# Patient Record
Sex: FEMALE | Race: WHITE | NOT HISPANIC OR LATINO | Employment: OTHER | URBAN - METROPOLITAN AREA
[De-identification: names, ages, dates, MRNs, and addresses within clinical notes are randomized per-mention and may not be internally consistent; named-entity substitution may affect disease eponyms.]

---

## 2017-02-23 ENCOUNTER — GENERIC CONVERSION - ENCOUNTER (OUTPATIENT)
Dept: OTHER | Facility: OTHER | Age: 67
End: 2017-02-23

## 2017-02-28 ENCOUNTER — GENERIC CONVERSION - ENCOUNTER (OUTPATIENT)
Dept: OTHER | Facility: OTHER | Age: 67
End: 2017-02-28

## 2017-03-20 ENCOUNTER — ALLSCRIPTS OFFICE VISIT (OUTPATIENT)
Dept: OTHER | Facility: OTHER | Age: 67
End: 2017-03-20

## 2018-01-13 VITALS
TEMPERATURE: 98.2 F | DIASTOLIC BLOOD PRESSURE: 80 MMHG | SYSTOLIC BLOOD PRESSURE: 124 MMHG | RESPIRATION RATE: 18 BRPM | HEART RATE: 78 BPM

## 2018-01-13 NOTE — RESULT NOTES
Verified Results  (Q) CBC (H/H, RBC, INDICES, WBC, PLT) 07Apr2016 08:52AM Joyce Esposito   REPORT COMMENT:  FASTING:YES     Test Name Result Flag Reference   WHITE BLOOD CELL COUNT 6 1 Thousand/uL  3 8-10 8   RED BLOOD CELL COUNT 4 51 Million/uL  3 80-5 10   HEMOGLOBIN 13 8 g/dL  11 7-15 5   HEMATOCRIT 41 8 %  35 0-45 0   MCV 92 9 fL  80 0-100 0   MCH 30 6 pg  27 0-33 0   MCHC 33 0 g/dL  32 0-36 0   RDW 13 1 %  11 0-15 0   PLATELET COUNT 982 Thousand/uL  140-400   MPV 7 6 fL  7 5-11 5     (Q) COMPREHENSIVE METABOLIC PNL W/ADJUSTED CALCIUM 07Apr2016 08:52AM Rizwana Esposito     Test Name Result Flag Reference   GLUCOSE 97 mg/dL  65-99   Fasting reference interval   UREA NITROGEN (BUN) 14 mg/dL  7-25   CREATININE 0 74 mg/dL  0 50-0 99   For patients >52years of age, the reference limit  for Creatinine is approximately 13% higher for people  identified as -American  eGFR NON-AFR  AMERICAN 85 mL/min/1 73m2  > OR = 60   eGFR AFRICAN AMERICAN 99 mL/min/1 73m2  > OR = 60   BUN/CREATININE RATIO   7-31   NOT APPLICABLE (calc)   SODIUM 141 mmol/L  135-146   POTASSIUM 3 8 mmol/L  3 5-5 3   CHLORIDE 103 mmol/L     CARBON DIOXIDE 29 mmol/L  19-30   CALCIUM 9 7 mg/dL  8 6-10 4   CALCIUM (ADJUSTED FOR$ALBUMIN) 9 9 mg/dL (calc)  8 6-10 2   PROTEIN, TOTAL 7 3 g/dL  6 1-8 1   ALBUMIN 4 1 g/dL  3 6-5 1   GLOBULIN 3 2 g/dL (calc)  1 9-3 7   ALBUMIN/GLOBULIN RATIO 1 3 (calc)  1 0-2 5   BILIRUBIN, TOTAL 0 6 mg/dL  0 2-1 2   ALKALINE PHOSPHATASE 64 U/L     AST 21 U/L  10-35   ALT 26 U/L  6-29     (Q) LIPID PANEL WITH REFLEX TO DIRECT LDL 07Apr2016 08:52AM Rizwana Esposito     Test Name Result Flag Reference   CHOLESTEROL, TOTAL 234 mg/dL H 125-200   HDL CHOLESTEROL 38 mg/dL L > OR = 46   TRIGLICERIDES 724 mg/dL H <150   LDL-CHOLESTEROL 155 mg/dL (calc) H <130   Desirable range <100 mg/dL for patients with CHD or  diabetes and <70 mg/dL for diabetic patients with  known heart disease     CHOL/HDLC RATIO 6 2 (calc) H < OR = 5 0   NON HDL CHOLESTEROL 196 mg/dL (calc) H    Target for non-HDL cholesterol is 30 mg/dL higher than   LDL cholesterol target

## 2018-01-18 NOTE — RESULT NOTES
Verified Results  (1) CBC/PLT/DIFF [id number] 09:17AM Eusebio Esposito   REPORT COMMENT:  FASTING:YES     Test Name Result Flag Reference   WHITE BLOOD CELL COUNT 5 9 Thousand/uL  3 8-10 8   RED BLOOD CELL COUNT 4 50 Million/uL  3 80-5 10   HEMOGLOBIN 13 9 g/dL  11 7-15 5   HEMATOCRIT 41 2 %  35 0-45 0   MCV 91 5 fL  80 0-100 0   MCH 31 0 pg  27 0-33 0   MCHC 33 9 g/dL  32 0-36 0   RDW 13 0 %  11 0-15 0   PLATELET COUNT 483 Thousand/uL H 140-400   MPV 7 7 fL  7 5-12 5   ABSOLUTE NEUTROPHILS 2950 cells/uL  6618-9999   ABSOLUTE LYMPHOCYTES 2543 cells/uL  850-3900   ABSOLUTE MONOCYTES 266 cells/uL  200-950   ABSOLUTE EOSINOPHILS 124 cells/uL     ABSOLUTE BASOPHILS 18 cells/uL  0-200   NEUTROPHILS 50 0 %     LYMPHOCYTES 43 1 %     MONOCYTES 4 5 %     EOSINOPHILS 2 1 %     BASOPHILS 0 3 %       (1) COMPREHENSIVE METABOLIC PANEL [id number] 20:57HC Eusebio Esposito     Test Name Result Flag Reference   GLUCOSE 103 mg/dL H 65-99   Fasting reference interval   UREA NITROGEN (BUN) 14 mg/dL  7-25   CREATININE 0 78 mg/dL  0 50-0 99   For patients >52years of age, the reference limit  for Creatinine is approximately 13% higher for people  identified as -American  eGFR NON-AFR   AMERICAN 79 mL/min/1 73m2  > OR = 60   eGFR AFRICAN AMERICAN 92 mL/min/1 73m2  > OR = 60   BUN/CREATININE RATIO   7-92   NOT APPLICABLE (calc)   SODIUM 139 mmol/L  135-146   POTASSIUM 4 2 mmol/L  3 5-5 3   CHLORIDE 101 mmol/L     CARBON DIOXIDE 29 mmol/L  20-31   CALCIUM 9 6 mg/dL  8 6-10 4   PROTEIN, TOTAL 7 5 g/dL  6 1-8 1   ALBUMIN 4 2 g/dL  3 6-5 1   GLOBULIN 3 3 g/dL (calc)  1 9-3 7   ALBUMIN/GLOBULIN RATIO 1 3 (calc)  1 0-2 5   BILIRUBIN, TOTAL 0 6 mg/dL  0 2-1 2   ALKALINE PHOSPHATASE 66 U/L     AST 24 U/L  10-35   ALT 33 U/L H 6-29     (1) LIPID PANEL, FASTING [id number] 09:17AM Eusebio Esposito     Test Name Result Flag Reference   CHOLESTEROL, TOTAL 242 mg/dL H 125-200   HDL CHOLESTEROL 36 mg/dL L > OR = 46   TRIGLICERIDES 260 mg/dL H <150   LDL-CHOLESTEROL 154 mg/dL (calc) H <130   Desirable range <100 mg/dL for patients with CHD or  diabetes and <70 mg/dL for diabetic patients with  known heart disease  CHOL/HDLC RATIO 6 7 (calc) H < OR = 5 0   NON HDL CHOLESTEROL 206 mg/dL (calc) H    Target for non-HDL cholesterol is 30 mg/dL higher than   LDL cholesterol target

## 2018-02-05 ENCOUNTER — TRANSCRIBE ORDERS (OUTPATIENT)
Dept: ADMINISTRATIVE | Facility: HOSPITAL | Age: 68
End: 2018-02-05

## 2018-02-05 DIAGNOSIS — Z00.00 ROUTINE ADULT HEALTH MAINTENANCE: Primary | ICD-10-CM

## 2018-02-25 RX ORDER — CHLORAL HYDRATE 500 MG
2 CAPSULE ORAL 2 TIMES DAILY
COMMUNITY
Start: 2012-04-16 | End: 2018-02-28 | Stop reason: SDUPTHER

## 2018-02-25 RX ORDER — ACYCLOVIR 50 MG/G
CREAM TOPICAL
COMMUNITY
Start: 2017-03-20 | End: 2019-03-13 | Stop reason: ALTCHOICE

## 2018-02-25 RX ORDER — HYDROCHLOROTHIAZIDE 12.5 MG/1
12.5 CAPSULE, GELATIN COATED ORAL DAILY
Refills: 3 | COMMUNITY
Start: 2017-12-09 | End: 2018-02-28 | Stop reason: SDUPTHER

## 2018-02-28 ENCOUNTER — OFFICE VISIT (OUTPATIENT)
Dept: FAMILY MEDICINE CLINIC | Facility: CLINIC | Age: 68
End: 2018-02-28
Payer: MEDICARE

## 2018-02-28 VITALS
DIASTOLIC BLOOD PRESSURE: 78 MMHG | HEIGHT: 63 IN | SYSTOLIC BLOOD PRESSURE: 146 MMHG | RESPIRATION RATE: 16 BRPM | BODY MASS INDEX: 31.01 KG/M2 | WEIGHT: 175 LBS | TEMPERATURE: 95.9 F | HEART RATE: 84 BPM

## 2018-02-28 DIAGNOSIS — Z23 NEED FOR PROPHYLACTIC VACCINATION AGAINST STREPTOCOCCUS PNEUMONIAE (PNEUMOCOCCUS): ICD-10-CM

## 2018-02-28 DIAGNOSIS — Z00.00 MEDICARE ANNUAL WELLNESS VISIT, INITIAL: Primary | ICD-10-CM

## 2018-02-28 DIAGNOSIS — I10 BENIGN ESSENTIAL HYPERTENSION: ICD-10-CM

## 2018-02-28 DIAGNOSIS — E78.2 MIXED HYPERLIPIDEMIA: ICD-10-CM

## 2018-02-28 DIAGNOSIS — L30.9 DERMATITIS: ICD-10-CM

## 2018-02-28 PROCEDURE — 90732 PPSV23 VACC 2 YRS+ SUBQ/IM: CPT

## 2018-02-28 PROCEDURE — G0009 ADMIN PNEUMOCOCCAL VACCINE: HCPCS

## 2018-02-28 PROCEDURE — G0438 PPPS, INITIAL VISIT: HCPCS | Performed by: INTERNAL MEDICINE

## 2018-02-28 PROCEDURE — 99214 OFFICE O/P EST MOD 30 MIN: CPT | Performed by: INTERNAL MEDICINE

## 2018-02-28 RX ORDER — HYDROCHLOROTHIAZIDE 12.5 MG/1
12.5 CAPSULE, GELATIN COATED ORAL DAILY
Qty: 90 CAPSULE | Refills: 3 | Status: SHIPPED | OUTPATIENT
Start: 2018-02-28 | End: 2018-09-12 | Stop reason: SDUPTHER

## 2018-02-28 RX ORDER — CHLORAL HYDRATE 500 MG
2 CAPSULE ORAL 2 TIMES DAILY
Qty: 360 CAPSULE | Refills: 3 | Status: SHIPPED | OUTPATIENT
Start: 2018-02-28 | End: 2018-09-12 | Stop reason: SDUPTHER

## 2018-02-28 NOTE — PROGRESS NOTES
Subjective:      Patient ID: Carmen Dash is a 79 y o  female  Chief Complaint   Patient presents with    Follow-up     review new labs       Here for follow up of hypertension and hyperlipidemia  She has been travelling a lot and gained some weight  Just lost 8 of this and is continuing to try  Would like to lose another 15 pounds  Started about a month ago with red, tingling, itching rash under L eye  Went to the dermatologist who put her on a steroid cream   This helped but now back  Denies any new soap, detergent, skin cream or other exposures  Tried aquaphor and it helped for a time but now back again  Very itchy and scaly  Sees dermatologist tonight at 6:30  The following portions of the patient's history were reviewed and updated as appropriate: allergies, current medications, past family history, past medical history, past social history, past surgical history and problem list     Review of Systems   Constitutional: Negative  Respiratory: Negative  Cardiovascular: Negative  Skin:        As per HPI  Current Outpatient Prescriptions   Medication Sig Dispense Refill    acyclovir (ZOVIRAX) 5 % cream Apply topically      aspirin 81 MG tablet Take 1 tablet by mouth daily      hydrochlorothiazide (MICROZIDE) 12 5 mg capsule Take 1 capsule (12 5 mg total) by mouth daily 90 capsule 3    Omega-3 1000 MG CAPS Take 2 capsules (2,000 mg total) by mouth 2 (two) times a day 360 capsule 3     No current facility-administered medications for this visit  Objective:    /78   Pulse 84   Temp (!) 95 9 °F (35 5 °C)   Resp 16   Ht 5' 3" (1 6 m)   Wt 79 4 kg (175 lb)   LMP  (LMP Unknown)   BMI 31 00 kg/m²        Physical Exam   Constitutional: She appears well-developed and well-nourished  HENT:   Head: Normocephalic and atraumatic  Eyes: Conjunctivae are normal    Neck: Neck supple  No JVD present  No thyromegaly present     Cardiovascular: Normal rate, regular rhythm, normal heart sounds and intact distal pulses  Pulmonary/Chest: Effort normal and breath sounds normal  She has no wheezes  She has no rales  Abdominal: Soft  Bowel sounds are normal    Musculoskeletal: She exhibits no edema  Skin:   L cheek and lateral angle of mouth with erythema and white scaling  Assessment/Plan:    Benign essential hypertension  Hypertension stable, continue HCTZ  Hyperlipidemia  She will continue omega 3's, low fat diet, exercise  Follow up after labs in 6 months  Dermatitis  Seeing dermatologist tonight  Diagnoses and all orders for this visit:    Medicare annual wellness visit, initial    Benign essential hypertension  -     CBC and differential; Future  -     Comprehensive metabolic panel; Future  -     Lipid panel; Future  -     hydrochlorothiazide (MICROZIDE) 12 5 mg capsule; Take 1 capsule (12 5 mg total) by mouth daily    Mixed hyperlipidemia  -     CBC and differential; Future  -     Comprehensive metabolic panel; Future  -     Lipid panel; Future  -     Omega-3 1000 MG CAPS; Take 2 capsules (2,000 mg total) by mouth 2 (two) times a day    Need for prophylactic vaccination against Streptococcus pneumoniae (pneumococcus)  -     PNEUMOCOCCAL POLYSACCHARIDE VACCINE 23-VALENT =>3YO SQ IM    Dermatitis    Other orders  -     aspirin 81 MG tablet; Take 1 tablet by mouth daily  -     Discontinue: hydrochlorothiazide (MICROZIDE) 12 5 mg capsule; Take 12 5 mg by mouth daily  -     Discontinue: Omega-3 1000 MG CAPS; Take 2 capsules by mouth 2 (two) times a day  -     acyclovir (ZOVIRAX) 5 % cream; Apply topically          No Follow-up on file         Saroj Julien MD

## 2018-02-28 NOTE — PROGRESS NOTES
Provider Screening     Preventative Screening/Counseling:   Cardiovascular Screening/Counseling:   (Labs Q5 years, EKG optional one-time)   General:  Risks and Benefits Discussed, Screening Current Counseling:  Healthy Diet, Healthy Weight, Improve Cholesterol, Improve Blood Pressure, Improve Exercise Tolerance          Diabetes Screening/Counseling:   (2 tests/year if Pre-Diabetes or 1 test/year if no Diabetes)   General:  Risks and Benefits Discussed, Screening Current           Colorectal Cancer Screening/Counseling:   (FOBT Q1 yr; Flex Sig Q4 yrs or Q10 yrs after Screening Colonoscopy; Screening Colonoscpy Q2 yrs High Risk or Q10 yrs Low Risk; Barium Enema Q2 yrs High Risk or Q4 yrs Low Risk)   General:  Patient Declines           Prostate Cancer Screening/Counseling:   (Annual)          Breast Cancer Screening/Counseling:   (Baseline Age 28 - 43; Annual Age 36+)   General:  Screening Current          Cervical Cancer Screening/Counseling:   (Annual for High Risk or Childbearing Age with Abnormal Pap in Last 3 yrs; Every 2 all others)   General:  Screening Not Indicated           Osteoporosis Screening/Counseling:   (Every 2 Yrs if at risk or more if medically necessary)   General:  Patient Declines           AAA Screening/Counseling:   (Once per Lifetime with risk factors)    General:  Screening Not Indicated           Glaucoma Screening/Counseling:   (Annual)   General:  Screening Current          HIV Screening/Counseling:   (Voluntary;  Once annually for high risk OR 3 times for Pregnancy at diagnosis of IUP; 3rd trimester; and at Labor   General:  Screening Not Indicated           Hepatitis C Screening:             Immunizations:   Influenza (annual):  Patient Declines   Pneumococcal (Once in a Lifetime):  Pneumococcal Due Today   Hepatitis B Series (low risk patients):  Series Not Indicated   Zostavax (Medicare D Coverage, Pt >66 yo):  Zostavax Vaccine UTD   TD (Non-Medicare Wellness  Visit required): Patient Declines       Other Preventative Couseling (Non-Medicare Wellness Visit Required):       Referrals (Non-Medicare Wellness Visit Required):       Medical Equipment/Suppliers:             HPI:  Emelina Kang is a 79 y o  female here for her Initial Wellness Visit  Patient Active Problem List   Diagnosis    Benign essential hypertension    Hyperlipidemia    Dermatitis    Medicare annual wellness visit, initial    Need for prophylactic vaccination against Streptococcus pneumoniae (pneumococcus)     No past medical history on file  No past surgical history on file  Family History   Problem Relation Age of Onset    Other Mother      aortic valve disorder    Stroke Mother      CVA    Hyperlipidemia Mother      History   Smoking Status    Never Smoker   Smokeless Tobacco    Never Used     History   Alcohol use Not on file      History   Drug use: Unknown     /78   Pulse 84   Temp (!) 95 9 °F (35 5 °C)   Resp 16   Ht 5' 3" (1 6 m)   Wt 79 4 kg (175 lb)   LMP  (LMP Unknown)   BMI 31 00 kg/m²       Current Outpatient Prescriptions   Medication Sig Dispense Refill    acyclovir (ZOVIRAX) 5 % cream Apply topically      aspirin 81 MG tablet Take 1 tablet by mouth daily      hydrochlorothiazide (MICROZIDE) 12 5 mg capsule Take 1 capsule (12 5 mg total) by mouth daily 90 capsule 3    Omega-3 1000 MG CAPS Take 2 capsules (2,000 mg total) by mouth 2 (two) times a day 360 capsule 3     No current facility-administered medications for this visit        Allergies   Allergen Reactions    Penicillins      Reaction Date: [id number];      Immunization History   Administered Date(s) Administered    Pneumococcal Conjugate 13-Valent 02/14/2017    Zoster 02/01/2016       Patient Care Team:  Roseanne Sheth MD as PCP - General      Medicare Screening Tests and Risk Assessments:  AWV Clinical     ISAR:   Previous hospitalizations?:  No       Once in a Lifetime Medicare Screening:   EKG performed?: Yes    AAA screening performed? (if performed, please add date to Health Maintenance):  No       Medicare Screening Tests and Risk Assessment:   AAA Risk Assessment    None Indicated:  Yes    Osteoporosis Risk Assessment    :  Yes    Age over 48:  Yes    HIV Risk Assessment    None indicated:  Yes        Drug and Alcohol Use:   Tobacco use    Cigarettes:  never smoker    Tobacco use duration    Tobacco Cessation Readiness    Alcohol use    Alcohol use:  occasional use    Alcohol Treatment Readiness   Illicit Drug Use    Drug use:  never    Drug type:  no sedative use       Diet & Exercise:   Diet   What is your diet?:  Regular   How many servings a day of the following:   Exercise    Do you currently exercise?:  yes    Frequency:  daily    Type of exercise:  walking   1 1/2 miles per day        Cognitive Impairment Screening:   Anxiety screenings preformed:   Yes Anxiety screen score:  0   Depression screening preformed:  Yes Depression screen score:  0   Depression screening results:  negative for symptoms   Cognitive Impairment Screening    Do you have difficulty learning or retaining new information?:  No Do you have difficulty handling new tasks?:  No   Do you have difficulty with reasoning?:  No Do you have difficulty with spatial ability and orientation?:  No   Do you have difficulty with language?:  No Do you have difficulty with behavior?:  No       Functional Ability/Level of Safety:   Hearing    Hearing difficulties:  No Bilateral:  normal   Left:  normal Right:  normal   Hearing Impairment Assessment    Current Activities    Status:  unlimited ADL's, unlimited driving, unlimited IADL's, unlimited social activities   Help needed with the folllowing:    Using the phone:  No Transportation:  No   Shopping:  No Preparing Meals:  No   Doing Housework:  No Doing Laundry:  No   Managing Medications:  No Managing Money:  No   ADL    Fall Risk   Have you fallen in the last 12 months?:  Yes Are you unsteady on your feet?:  No   How many times?:  1 Are you taking any medications that may cause fatigue or dizziness?:  No    Do you rush to the bathroom potentially risking a fall?:  No   Injury History   Polypharmacy:  No Antidepressant Use:  No   Sedative Use:  No Antihypertensive Use:  Yes   Previous Fall:  No Alcohol Use:  No   Deconditioning:  No Visual Impairment:  No   Cogitive Impairment:  No Mmobility Impairment:  No   Postural Hypotension:  No Urinary Incontinence:  No       Home Safety:   Are there hazards in your environment?:  No   If you fell, would you need help to get back up from the ground?:  No Do you have problems or concerns getting in/out of a bed, chair, tub, or toilet?:  No   Do you feel unsteady when walking?:  No Is your activity limited by pain?:  No   Do you have handrails and grab-bars in the home?:  Yes Are emergency numbers kept by the phone and regularly updated?:  No   Are you and/or family members aware of the dangers of smoking in bed?:  No Are firearms stored securely?:  No   Do you have working smoke alarms and fire extinguisher?:  No    Have you left the stove on unsupervised?:  No    Home Safety Risk Factors   Unfamilar with surroundings:  No Uneven floors:  No   Stairs or handrail saftey risk:  No Loose rugs:  No   Household clutter:  No Poor household lighting:  No   No grab bars in bathroom:  No Further evaluation needed:  No       Advanced Directives:   Advanced Directives    Living Will:  Yes Durable POA for healthcare:  Yes   Patient's End of Life Decisions        Urinary Incontinence:       Glaucoma:            Provider Screening     Preventative Screening/Counseling:   Cardiovascular Screening/Counseling:   (Labs Q5 years, EKG optional one-time)   General:  Risks and Benefits Discussed, Screening Current Counseling:  Healthy Diet, Healthy Weight, Improve Cholesterol, Improve Blood Pressure, Improve Exercise Tolerance          Diabetes Screening/Counseling:   (2 tests/year if Pre-Diabetes or 1 test/year if no Diabetes)   General:  Risks and Benefits Discussed, Screening Current           Colorectal Cancer Screening/Counseling:   (FOBT Q1 yr; Flex Sig Q4 yrs or Q10 yrs after Screening Colonoscopy; Screening Colonoscpy Q2 yrs High Risk or Q10 yrs Low Risk; Barium Enema Q2 yrs High Risk or Q4 yrs Low Risk)   General:  Patient Declines           Prostate Cancer Screening/Counseling:   (Annual)          Breast Cancer Screening/Counseling:   (Baseline Age 28 - 43; Annual Age 36+)   General:  Screening Current          Cervical Cancer Screening/Counseling:   (Annual for High Risk or Childbearing Age with Abnormal Pap in Last 3 yrs; Every 2 all others)   General:  Screening Not Indicated           Osteoporosis Screening/Counseling:   (Every 2 Yrs if at risk or more if medically necessary)   General:  Patient Declines           AAA Screening/Counseling:   (Once per Lifetime with risk factors)    General:  Screening Not Indicated           Glaucoma Screening/Counseling:   (Annual)   General:  Screening Current          HIV Screening/Counseling:   (Voluntary;  Once annually for high risk OR 3 times for Pregnancy at diagnosis of IUP; 3rd trimester; and at Labor   General:  Screening Not Indicated           Hepatitis C Screening:             Immunizations:   Influenza (annual):  Patient Declines   Pneumococcal (Once in a Lifetime):  Pneumococcal Due Today   Hepatitis B Series (low risk patients):  Series Not Indicated   Zostavax (Medicare D Coverage, Pt >64 yo):  Zostavax Vaccine UTD   TD (Non-Medicare Wellness  Visit required):  Patient Declines       Other Preventative Couseling (Non-Medicare Wellness Visit Required):       Referrals (Non-Medicare Wellness Visit Required):       Medical Equipment/Suppliers:           No exam data present  Reviewed Updated [facility] Prior Wellness Visits:   Last Medicare wellness visit information was reviewed, patient interviewed , no change since last AWVyes  Last Medicare wellness visit information was reviewed, patient interviewed and updates made to the record today yes    Assessment and Plan:  1  Medicare annual wellness visit, initial     2  Benign essential hypertension  CBC and differential    Comprehensive metabolic panel    Lipid panel    hydrochlorothiazide (MICROZIDE) 12 5 mg capsule   3  Mixed hyperlipidemia  CBC and differential    Comprehensive metabolic panel    Lipid panel    Omega-3 1000 MG CAPS   4  Need for prophylactic vaccination against Streptococcus pneumoniae (pneumococcus)  PNEUMOCOCCAL POLYSACCHARIDE VACCINE 23-VALENT =>1YO SQ IM   5   Dermatitis         Health Maintenance Due   Topic Date Due    Hepatitis C Screening  1950    MAMMOGRAM  1950    DTaP,Tdap,and Td Vaccines (1 - Tdap) 12/04/1957    Depression Screening PHQ-9  12/04/1962    Fall Risk  12/04/2015    Urinary Incontinence Screening  12/04/2015    PNEUMOCOCCAL POLYSACCHARIDE VACCINE AGE 65 AND OVER  12/04/2015    INFLUENZA VACCINE  09/01/2017    GLAUCOMA SCREENING 67+ YR  12/04/2017

## 2018-08-12 DIAGNOSIS — E78.2 MIXED HYPERLIPIDEMIA: Primary | ICD-10-CM

## 2018-08-13 RX ORDER — OMEGA-3-ACID ETHYL ESTERS 1 G/1
CAPSULE, LIQUID FILLED ORAL
Qty: 360 CAPSULE | Refills: 3 | Status: SHIPPED | OUTPATIENT
Start: 2018-08-13 | End: 2018-09-12 | Stop reason: SDUPTHER

## 2018-09-08 NOTE — PROGRESS NOTES
Subjective:      Patient ID: Hermila Hughes is a 79 y o  female  Chief Complaint   Patient presents with    Follow-up     review [facility]       Checks bp at home and usually 120/70  She has been doing a lot of walking, trying to stay active  No side effects with medications  No complaints or issues today  The following portions of the patient's history were reviewed and updated as appropriate: allergies, current medications, past family history, past medical history, past social history, past surgical history and problem list     Review of Systems   Constitutional: Negative  Respiratory: Negative  Cardiovascular: Negative  Current Outpatient Prescriptions   Medication Sig Dispense Refill    acyclovir (ZOVIRAX) 5 % cream Apply topically      aspirin 81 MG tablet Take 1 tablet by mouth daily      hydrochlorothiazide (MICROZIDE) 12 5 mg capsule Take 1 capsule (12 5 mg total) by mouth daily 90 capsule 3    omega-3-acid ethyl esters (LOVAZA) 1 g capsule Take 2 capsules (2 g total) by mouth 2 (two) times a day Generic if possible, patient request 360 capsule 3     No current facility-administered medications for this visit  Objective:    /76   Pulse 84   Temp (!) 97 2 °F (36 2 °C)   Resp 16   Ht 5' 3" (1 6 m)   Wt 78 9 kg (174 lb)   BMI 30 82 kg/m²        Physical Exam   Constitutional: She appears well-developed and well-nourished  Eyes: Conjunctivae are normal    Neck: Neck supple  No JVD present  No thyromegaly present  Cardiovascular: Normal rate, regular rhythm, normal heart sounds and intact distal pulses  Exam reveals no gallop and no friction rub  No murmur heard  Pulmonary/Chest: Effort normal and breath sounds normal  She has no wheezes  She has no rales  Abdominal: Soft  Bowel sounds are normal  She exhibits no distension  There is no tenderness  Musculoskeletal: She exhibits no edema           Assessment/Plan:    Benign essential hypertension  Hypertension stable on current medication  Will continue HCTZ  Hyperlipidemia  Reviewed bloodwork with patient  She does not desire or tolerate statins  Continue fish oil at current dose, discussed need for physical activity and dietary restriction  Diagnoses and all orders for this visit:    Benign essential hypertension  -     CBC and differential; Future  -     Comprehensive metabolic panel; Future  -     Lipid panel; Future  -     hydrochlorothiazide (MICROZIDE) 12 5 mg capsule; Take 1 capsule (12 5 mg total) by mouth daily    Mixed hyperlipidemia  -     CBC and differential; Future  -     Comprehensive metabolic panel; Future  -     Lipid panel; Future  -     omega-3-acid ethyl esters (LOVAZA) 1 g capsule; Take 2 capsules (2 g total) by mouth 2 (two) times a day Generic if possible, patient request    Needs flu shot  -     influenza vaccine, [id number], high-dose, PF 0 5 mL, for patients 65 yr+ (FLUZONE HIGH-DOSE)    Cold intolerance  -     TSH, 3rd generation with Free T4 reflex; Future          Return in about 4 weeks (around 10/10/2018) for 1/2 hour AWV and follow up  Rudy Nelson MD

## 2018-09-12 ENCOUNTER — OFFICE VISIT (OUTPATIENT)
Dept: FAMILY MEDICINE CLINIC | Facility: CLINIC | Age: 68
End: 2018-09-12
Payer: MEDICARE

## 2018-09-12 VITALS
RESPIRATION RATE: 16 BRPM | SYSTOLIC BLOOD PRESSURE: 136 MMHG | WEIGHT: 174 LBS | DIASTOLIC BLOOD PRESSURE: 76 MMHG | HEART RATE: 84 BPM | TEMPERATURE: 97.2 F | BODY MASS INDEX: 30.83 KG/M2 | HEIGHT: 63 IN

## 2018-09-12 DIAGNOSIS — E78.2 MIXED HYPERLIPIDEMIA: ICD-10-CM

## 2018-09-12 DIAGNOSIS — R68.89 COLD INTOLERANCE: ICD-10-CM

## 2018-09-12 DIAGNOSIS — I10 BENIGN ESSENTIAL HYPERTENSION: Primary | ICD-10-CM

## 2018-09-12 DIAGNOSIS — Z23 NEEDS FLU SHOT: ICD-10-CM

## 2018-09-12 PROBLEM — L30.9 DERMATITIS: Status: RESOLVED | Noted: 2018-02-28 | Resolved: 2018-09-12

## 2018-09-12 PROBLEM — Z00.00 MEDICARE ANNUAL WELLNESS VISIT, INITIAL: Status: RESOLVED | Noted: 2018-02-28 | Resolved: 2018-09-12

## 2018-09-12 PROCEDURE — G0008 ADMIN INFLUENZA VIRUS VAC: HCPCS

## 2018-09-12 PROCEDURE — 90662 IIV NO PRSV INCREASED AG IM: CPT

## 2018-09-12 PROCEDURE — 99213 OFFICE O/P EST LOW 20 MIN: CPT | Performed by: INTERNAL MEDICINE

## 2018-09-12 RX ORDER — OMEGA-3-ACID ETHYL ESTERS 1 G/1
2 CAPSULE, LIQUID FILLED ORAL 2 TIMES DAILY
Qty: 360 CAPSULE | Refills: 3 | Status: SHIPPED | OUTPATIENT
Start: 2018-09-12 | End: 2019-03-13 | Stop reason: SDUPTHER

## 2018-09-12 RX ORDER — HYDROCHLOROTHIAZIDE 12.5 MG/1
12.5 CAPSULE, GELATIN COATED ORAL DAILY
Qty: 90 CAPSULE | Refills: 3 | Status: SHIPPED | OUTPATIENT
Start: 2018-09-12 | End: 2019-03-13 | Stop reason: SDUPTHER

## 2018-09-12 NOTE — ASSESSMENT & PLAN NOTE
Reviewed bloodwork with patient  She does not desire or tolerate statins  Continue fish oil at current dose, discussed need for physical activity and dietary restriction

## 2018-10-31 ENCOUNTER — TELEPHONE (OUTPATIENT)
Dept: FAMILY MEDICINE CLINIC | Facility: CLINIC | Age: 68
End: 2018-10-31

## 2018-10-31 DIAGNOSIS — E78.2 MIXED HYPERLIPIDEMIA: Primary | ICD-10-CM

## 2018-10-31 NOTE — TELEPHONE ENCOUNTER
[facility] in [employer] left a voicemail on Rx hotline requesting if it was alright to give patient generic Lovaza instead of brand name   Please advise

## 2018-10-31 NOTE — TELEPHONE ENCOUNTER
Left message on machine requesting a call back  Please ask pt if generic is okay or if she prefers brand name  Thank you   Noris Rosales

## 2019-01-25 ENCOUNTER — EVALUATION (OUTPATIENT)
Dept: PHYSICAL THERAPY | Facility: MEDICAL CENTER | Age: 69
End: 2019-01-25
Payer: MEDICARE

## 2019-01-25 DIAGNOSIS — G89.29 CHRONIC PAIN OF RIGHT KNEE: Primary | ICD-10-CM

## 2019-01-25 DIAGNOSIS — M25.561 CHRONIC PAIN OF RIGHT KNEE: Primary | ICD-10-CM

## 2019-01-25 PROCEDURE — 97161 PT EVAL LOW COMPLEX 20 MIN: CPT | Performed by: PHYSICAL THERAPIST

## 2019-01-25 NOTE — LETTER
2019    Harry Buchanan MD  [facility]    Patient: Ana Lopez   YOB: 1950   Date of Visit: 2019     Encounter Diagnosis     ICD-10-CM    1  Chronic pain of right knee M25 561     G89 29        Dear Dr Mahnaz Ventura:    Please review the attached Plan of Care from Nathan Zuniga recent visit  Please verify that you agree therapy should continue by signing the attached document and sending it back to our office  If you have any questions or concerns, please don't hesitate to call  Sincerely,    Jaison Bautista PT      Referring Provider:      I certify that I have read the below Plan of Care and certify the need for these services furnished under this plan of treatment while under my care  Harry Buchanan MD  [facility]: [phone]          PT Evaluation     Today's date: 2019  Patient name: Ana Lopez  : 1950  MRN: [id number]  Referring provider: Sina Grant MD  Dx:   Encounter Diagnosis     ICD-10-CM    1  Chronic pain of right knee M25 561     G89 29                   Assessment  Assessment details: Ana Lopez is a 76 y o  female was evaluated on 2019  for Chronic pain of right knee  (primary encounter diagnosis)  Ana Lopez has the above listed impairments resulting in functional deficits and negative impact to quality of life  Patient is appropriate for skilled PT intervention to promote maximal return to function and patient specific goals  Patient agrees with outlined treatment plan and all questions were answered to their satisfaction        Impairments: abnormal muscle firing, abnormal muscle tone, abnormal or restricted ROM, impaired physical strength, lacks appropriate home exercise program and pain with function  Understanding of Dx/Px/POC: good   Prognosis: good    Goals  Patient will successfully transition to home exercise program   Patient will be able to manage symptoms independently  Patient will report no limitation in stairs   Patient will report no pain when vacuuming   Patient will report no pain when bending to lift something off floor     Plan  Patient would benefit from: skilled PT  Referral necessary: No  Planned modality interventions: thermotherapy: hydrocollator packs  Planned therapy interventions: home exercise program, manual therapy, neuromuscular re-education, patient education, functional ROM exercises, strengthening, stretching, joint mobilization, graded activity, graded exercise, therapeutic exercise, body mechanics training, motor coordination training and activity modification  Frequency: 2x week  Duration in weeks: 12  Treatment plan discussed with: patient        Subjective Evaluation    History of Present Illness  Mechanism of injury: Pablito Peacock is a 76 y o  female presenting to therapy with right knee pain  She notes that she had pain in bilateral knees for many years and currently right knee is much worse  She notes that she will have giving out episodes and aching pain at rest with difficult going up and down stairs  She travels frequently and is worried about not being able to keep up and see all the sights she wishes to due to knee       Pain  Current pain rating: 3  At best pain rating: 3  At worst pain ratin  Quality: dull ache    Patient Goals  Patient goals for therapy: decreased pain, increased strength, independence with ADLs/IADLs and return to sport/leisure activities          Objective   Red flag screen negative  Reflexes WNL  Knee strength WNL with exception of extension 4-/5 with pain  Mild TTP of patellar and quadricep tendon  No ligamentous instability   Hip strength 4-/5 all directions with exception of 3+/5 abduction   Lateral hip and ITB tenderness  Poor patellar mobility on R         Flowsheet Rows Most Recent Value   PT/OT G-Codes   Current Score  37   Projected Score  55          Precautions: None    Daily Treatment Diary     Manual  1/25            MFR to distal quad and lateral thigh AF                                                                    Exercise Diary              Quad sets 5 sec  X20            Heel slides  30                                                                                                                                                                                                                                                          Modalities

## 2019-01-26 NOTE — PROGRESS NOTES
PT Evaluation     Today's date: 2019  Patient name: Lolis Snyder  : 1950  MRN: [id number]  Referring provider: Stephane Gorman MD  Dx:   Encounter Diagnosis     ICD-10-CM    1  Chronic pain of right knee M25 561     G89 29                   Assessment  Assessment details: Lolis Snyder is a 76 y o  female was evaluated on 2019  for Chronic pain of right knee  (primary encounter diagnosis)  Lolis Snyder has the above listed impairments resulting in functional deficits and negative impact to quality of life  Patient is appropriate for skilled PT intervention to promote maximal return to function and patient specific goals  Patient agrees with outlined treatment plan and all questions were answered to their satisfaction  Impairments: abnormal muscle firing, abnormal muscle tone, abnormal or restricted ROM, impaired physical strength, lacks appropriate home exercise program and pain with function  Understanding of Dx/Px/POC: good   Prognosis: good    Goals  Patient will successfully transition to home exercise program   Patient will be able to manage symptoms independently      Patient will report no limitation in stairs   Patient will report no pain when vacuuming   Patient will report no pain when bending to lift something off floor     Plan  Patient would benefit from: skilled PT  Referral necessary: No  Planned modality interventions: thermotherapy: hydrocollator packs  Planned therapy interventions: home exercise program, manual therapy, neuromuscular re-education, patient education, functional ROM exercises, strengthening, stretching, joint mobilization, graded activity, graded exercise, therapeutic exercise, body mechanics training, motor coordination training and activity modification  Frequency: 2x week  Duration in weeks: 12  Treatment plan discussed with: patient        Subjective Evaluation    History of Present Illness  Mechanism of injury: Sindy Jacobs Selam Ambriz is a 76 y o  female presenting to therapy with right knee pain  She notes that she had pain in bilateral knees for many years and currently right knee is much worse  She notes that she will have giving out episodes and aching pain at rest with difficult going up and down stairs  She travels frequently and is worried about not being able to keep up and see all the sights she wishes to due to knee       Pain  Current pain rating: 3  At best pain rating: 3  At worst pain ratin  Quality: dull ache    Patient Goals  Patient goals for therapy: decreased pain, increased strength, independence with ADLs/IADLs and return to sport/leisure activities          Objective   Red flag screen negative  Reflexes WNL  Knee strength WNL with exception of extension 4-/5 with pain  Mild TTP of patellar and quadricep tendon  No ligamentous instability   Hip strength 4-/5 all directions with exception of 3+/5 abduction   Lateral hip and ITB tenderness  Poor patellar mobility on R         Flowsheet Rows      Most Recent Value   PT/OT G-Codes   Current Score  37   Projected Score  55          Precautions: None    Daily Treatment Diary     Manual              MFR to distal quad and lateral thigh AF                                                                    Exercise Diary              Quad sets 5 sec  X20            Heel slides  30                                                                                                                                                                                                                                                          Modalities

## 2019-01-28 ENCOUNTER — TRANSCRIBE ORDERS (OUTPATIENT)
Dept: PHYSICAL THERAPY | Facility: MEDICAL CENTER | Age: 69
End: 2019-01-28

## 2019-01-28 DIAGNOSIS — G89.29 CHRONIC PAIN OF RIGHT KNEE: Primary | ICD-10-CM

## 2019-01-28 DIAGNOSIS — M25.561 CHRONIC PAIN OF RIGHT KNEE: Primary | ICD-10-CM

## 2019-01-29 ENCOUNTER — APPOINTMENT (OUTPATIENT)
Dept: PHYSICAL THERAPY | Facility: MEDICAL CENTER | Age: 69
End: 2019-01-29
Payer: MEDICARE

## 2019-01-30 ENCOUNTER — APPOINTMENT (OUTPATIENT)
Dept: PHYSICAL THERAPY | Facility: MEDICAL CENTER | Age: 69
End: 2019-01-30
Payer: MEDICARE

## 2019-02-01 ENCOUNTER — APPOINTMENT (OUTPATIENT)
Dept: PHYSICAL THERAPY | Facility: MEDICAL CENTER | Age: 69
End: 2019-02-01
Payer: MEDICARE

## 2019-02-05 ENCOUNTER — APPOINTMENT (OUTPATIENT)
Dept: PHYSICAL THERAPY | Facility: MEDICAL CENTER | Age: 69
End: 2019-02-05
Payer: MEDICARE

## 2019-02-06 ENCOUNTER — OFFICE VISIT (OUTPATIENT)
Dept: PHYSICAL THERAPY | Facility: MEDICAL CENTER | Age: 69
End: 2019-02-06
Payer: MEDICARE

## 2019-02-06 DIAGNOSIS — G89.29 CHRONIC PAIN OF RIGHT KNEE: Primary | ICD-10-CM

## 2019-02-06 DIAGNOSIS — M25.561 CHRONIC PAIN OF RIGHT KNEE: Primary | ICD-10-CM

## 2019-02-06 PROCEDURE — 97112 NEUROMUSCULAR REEDUCATION: CPT | Performed by: PHYSICAL THERAPIST

## 2019-02-06 PROCEDURE — 97110 THERAPEUTIC EXERCISES: CPT | Performed by: PHYSICAL THERAPIST

## 2019-02-06 NOTE — PROGRESS NOTES
Daily Note     Today's date: 2019  Patient name: Donna Silva  : 1950  MRN: [id number]  Referring provider: Epifanio Rueda MD  Dx:   Encounter Diagnosis     ICD-10-CM    1  Chronic pain of right knee M25 561     G89 29                   Subjective: Patient states significant reduction in pain after manual intervention from IE, states next day significant inflammation around area of Patella; patient stated she wishes to hold on manual intervention this visit  Objective: See treatment diary below  Precautions: None     Daily Treatment Diary      Manual                       MFR to distal quad and lateral thigh AF                                                                                                                           Exercise Diary                        Quad sets 5 sec  X20  5 sec x20                   Heel slides  30  10x 10"                   Recumbent bike    5 min                   Standing knee flexion    20 ea                    SLR flex    20 ea                   Supine ITB stretch w/strap    4x30"                                                                                                                                                                                                                                                                                                                                                                          Modalities                                                                                                        Assessment: Patient required verbal cueing for proper technique with activities; patient demonstrated limited tolerance to exercise secondary to c/o pain; patient advised to add strap ITB stretch at home for c/o right ITB tightness  Plan: Continue per plan of care

## 2019-02-08 ENCOUNTER — APPOINTMENT (OUTPATIENT)
Dept: PHYSICAL THERAPY | Facility: MEDICAL CENTER | Age: 69
End: 2019-02-08
Payer: MEDICARE

## 2019-02-12 ENCOUNTER — APPOINTMENT (OUTPATIENT)
Dept: PHYSICAL THERAPY | Facility: MEDICAL CENTER | Age: 69
End: 2019-02-12
Payer: MEDICARE

## 2019-02-13 ENCOUNTER — OFFICE VISIT (OUTPATIENT)
Dept: PHYSICAL THERAPY | Facility: MEDICAL CENTER | Age: 69
End: 2019-02-13
Payer: MEDICARE

## 2019-02-13 DIAGNOSIS — G89.29 CHRONIC PAIN OF RIGHT KNEE: Primary | ICD-10-CM

## 2019-02-13 DIAGNOSIS — M25.561 CHRONIC PAIN OF RIGHT KNEE: Primary | ICD-10-CM

## 2019-02-13 PROCEDURE — 97112 NEUROMUSCULAR REEDUCATION: CPT

## 2019-02-13 PROCEDURE — 97110 THERAPEUTIC EXERCISES: CPT

## 2019-02-13 NOTE — PROGRESS NOTES
Daily Note     Today's date: 2019  Patient name: Lex Mathew  : 1950  MRN: [id number]  Referring provider: Ulices Grider MD  Dx:   Encounter Diagnosis     ICD-10-CM    1  Chronic pain of right knee M25 561     G89 29                2:00- 2:30 1:1 CF       Subjective: Pt reports 4/10 pain in her right knee  Pt states her knee hurts more when she is bending her knee rather then straightening it  Pt states she is always in more pain when she does physical therapy  Objective: See treatment diary below  Precautions: None     Daily Treatment Diary      Manual                       MFR to distal quad and lateral thigh AF                                                                                                                          Exercise Diary                      Quad sets 5 sec  X20  5 sec x20  5" x 20                  Heel slides  30  10x 10"  10 x10"                  Recumbent bike    5 min 5 mins                  Standing knee flexion    20 ea   20x ea                 SLR flex    20 ea  20x ea                 Supine ITB stretch w/strap    4x30"   4 x 30"                   standing abd      2 x10 ea                   standing hip ext      2 x10 ea                                                                                                                                                                                                                                                                                                                       Modalities                                                                                                        Assessment: Pt progressed through exercises fair as she was limited with pain  Pt wouldn't allow PROM or STM along the right knee  Pt was very tender along the ITband and didn't allow for any STM and minimal palpation  Pt was about to perform standing exercises well with no increase of pain   Pt would continue to benefit from PT  Plan: Continue per plan of care

## 2019-02-15 ENCOUNTER — APPOINTMENT (OUTPATIENT)
Dept: PHYSICAL THERAPY | Facility: MEDICAL CENTER | Age: 69
End: 2019-02-15
Payer: MEDICARE

## 2019-03-11 ENCOUNTER — TELEPHONE (OUTPATIENT)
Dept: FAMILY MEDICINE CLINIC | Facility: CLINIC | Age: 69
End: 2019-03-11

## 2019-03-11 DIAGNOSIS — Z20.828 EXPOSURE TO THE FLU: Primary | ICD-10-CM

## 2019-03-11 PROBLEM — M22.2X1 PATELLOFEMORAL SYNDROME OF RIGHT KNEE: Status: ACTIVE | Noted: 2019-01-18

## 2019-03-11 PROBLEM — M76.31 ILIOTIBIAL BAND SYNDROME OF RIGHT SIDE: Status: ACTIVE | Noted: 2019-01-18

## 2019-03-11 RX ORDER — OSELTAMIVIR PHOSPHATE 75 MG/1
75 CAPSULE ORAL DAILY
Qty: 10 CAPSULE | Refills: 0 | Status: SHIPPED | OUTPATIENT
Start: 2019-03-11 | End: 2019-03-21

## 2019-03-11 NOTE — TELEPHONE ENCOUNTER
[facility] [employer]     Son was diagnosed with the flu yesterday and told his mom she should take Tamiflu  She currently has no symptoms      Please call patient back     Thank you

## 2019-03-11 NOTE — PROGRESS NOTES
Subjective:      Patient ID: Ernesto Hinds is a 76 y o  female  Chief Complaint   Patient presents with    Follow-up    Blood Pressure Check       Here for follow up of hyperlipidemia  Is trying to follow a diet and is taking fish oil regularly  Had some recent labs and TSH was elevated  Feels fatigued and is frequently cold  No weight gain or constipation  Travelling to [facility] and is asking about altitude sickness medications  The following portions of the patient's history were reviewed and updated as appropriate: allergies, current medications, past family history, past medical history, past social history, past surgical history and problem list     Review of Systems   Constitutional: Positive for fatigue  Respiratory: Negative  Cardiovascular: Negative  Current Outpatient Medications   Medication Sig Dispense Refill    aspirin 81 MG tablet Take 1 tablet by mouth daily      hydrochlorothiazide (MICROZIDE) 12 5 mg capsule Take 1 capsule (12 5 mg total) by mouth daily 90 capsule 3    omega-3-acid ethyl esters (LOVAZA) 1 g capsule Take 2 capsules (2 g total) by mouth 2 (two) times a day Generic if possible, patient request 360 capsule 3    oseltamivir (TAMIFLU) 75 mg capsule Take 1 capsule (75 mg total) by mouth daily for 10 days 10 capsule 0    acetaZOLAMIDE (DIAMOX) 250 mg tablet Take 1 tablet (250 mg total) by mouth 2 (two) times a day 10 tablet 0    levothyroxine 25 mcg tablet Take 1 tablet (25 mcg total) by mouth daily in the early morning 90 tablet 3     No current facility-administered medications for this visit  Objective:    /80   Pulse 56   Temp (!) 96 9 °F (36 1 °C)   Resp 16   Ht 5' 3" (1 6 m)   Wt 78 9 kg (174 lb)   BMI 30 82 kg/m²        Physical Exam   Constitutional: She appears well-developed and well-nourished  Eyes: Conjunctivae are normal    Neck: Neck supple  No JVD present  No thyromegaly present     Cardiovascular: Normal rate, regular rhythm, normal heart sounds and intact distal pulses  Exam reveals no gallop and no friction rub  No murmur heard  Pulmonary/Chest: Effort normal and breath sounds normal  She has no wheezes  She has no rales  Abdominal: Soft  Bowel sounds are normal  She exhibits no distension  There is no tenderness  Musculoskeletal: She exhibits no edema  Assessment/Plan:    Other specified hypothyroidism  Reviewed labs with patient  Will start levothyroxine low dose  Given instructions on how to take  Discussed possible side effects  Will recheck TSH in 6 weeks to reassess  Benign essential hypertension  Well controlled on HCTZ 12 5 mg daily  Hyperlipidemia  Reviewed labs with patient, lipids continue to be elevated but are stable  Will continue her current medications  She refuses statin  Diagnoses and all orders for this visit:    Mixed hyperlipidemia  -     omega-3-acid ethyl esters (LOVAZA) 1 g capsule; Take 2 capsules (2 g total) by mouth 2 (two) times a day Generic if possible, patient request  -     CBC and differential; Future  -     Comprehensive metabolic panel; Future  -     Lipid panel; Future  -     TSH, 3rd generation with Free T4 reflex; Future    Benign essential hypertension  -     hydrochlorothiazide (MICROZIDE) 12 5 mg capsule; Take 1 capsule (12 5 mg total) by mouth daily    Chronic bilateral low back pain with bilateral sciatica  -     MRI lumbar spine wo contrast; Future    Abnormal thyroid blood test  -     CBC and differential; Future  -     Comprehensive metabolic panel; Future  -     Lipid panel; Future  -     TSH, 3rd generation with Free T4 reflex; Future    Anoxia due to high altitude, initial encounter  -     acetaZOLAMIDE (DIAMOX) 250 mg tablet; Take 1 tablet (250 mg total) by mouth 2 (two) times a day    Other specified hypothyroidism          Return for 1/2 hour AWV and follow up         Saroj Julien MD

## 2019-03-13 ENCOUNTER — OFFICE VISIT (OUTPATIENT)
Dept: FAMILY MEDICINE CLINIC | Facility: CLINIC | Age: 69
End: 2019-03-13
Payer: MEDICARE

## 2019-03-13 VITALS
HEIGHT: 63 IN | WEIGHT: 174 LBS | DIASTOLIC BLOOD PRESSURE: 80 MMHG | SYSTOLIC BLOOD PRESSURE: 132 MMHG | BODY MASS INDEX: 30.83 KG/M2 | HEART RATE: 56 BPM | TEMPERATURE: 96.9 F | RESPIRATION RATE: 16 BRPM

## 2019-03-13 DIAGNOSIS — E78.2 MIXED HYPERLIPIDEMIA: Primary | ICD-10-CM

## 2019-03-13 DIAGNOSIS — G89.29 CHRONIC BILATERAL LOW BACK PAIN WITH BILATERAL SCIATICA: ICD-10-CM

## 2019-03-13 DIAGNOSIS — T70.29XA ANOXIA DUE TO HIGH ALTITUDE, INITIAL ENCOUNTER: ICD-10-CM

## 2019-03-13 DIAGNOSIS — I10 BENIGN ESSENTIAL HYPERTENSION: ICD-10-CM

## 2019-03-13 DIAGNOSIS — E03.8 OTHER SPECIFIED HYPOTHYROIDISM: Primary | ICD-10-CM

## 2019-03-13 DIAGNOSIS — E03.8 OTHER SPECIFIED HYPOTHYROIDISM: ICD-10-CM

## 2019-03-13 DIAGNOSIS — R79.89 ABNORMAL THYROID BLOOD TEST: ICD-10-CM

## 2019-03-13 DIAGNOSIS — M54.42 CHRONIC BILATERAL LOW BACK PAIN WITH BILATERAL SCIATICA: ICD-10-CM

## 2019-03-13 DIAGNOSIS — M54.41 CHRONIC BILATERAL LOW BACK PAIN WITH BILATERAL SCIATICA: ICD-10-CM

## 2019-03-13 PROCEDURE — 99214 OFFICE O/P EST MOD 30 MIN: CPT | Performed by: INTERNAL MEDICINE

## 2019-03-13 RX ORDER — HYDROCHLOROTHIAZIDE 12.5 MG/1
12.5 CAPSULE, GELATIN COATED ORAL DAILY
Qty: 90 CAPSULE | Refills: 3 | Status: SHIPPED | OUTPATIENT
Start: 2019-03-13 | End: 2020-04-20

## 2019-03-13 RX ORDER — OMEGA-3-ACID ETHYL ESTERS 1 G/1
2 CAPSULE, LIQUID FILLED ORAL 2 TIMES DAILY
Qty: 360 CAPSULE | Refills: 3 | Status: SHIPPED | OUTPATIENT
Start: 2019-03-13 | End: 2020-04-01

## 2019-03-13 RX ORDER — ACETAZOLAMIDE 250 MG/1
250 TABLET ORAL 2 TIMES DAILY
Qty: 10 TABLET | Refills: 0 | Status: SHIPPED | OUTPATIENT
Start: 2019-03-13 | End: 2019-10-02 | Stop reason: ALTCHOICE

## 2019-03-13 RX ORDER — LEVOTHYROXINE SODIUM 0.03 MG/1
25 TABLET ORAL
Qty: 90 TABLET | Refills: 3 | Status: SHIPPED | OUTPATIENT
Start: 2019-03-13 | End: 2019-09-16 | Stop reason: SDUPTHER

## 2019-03-13 NOTE — ASSESSMENT & PLAN NOTE
Reviewed labs with patient, lipids continue to be elevated but are stable  Will continue her current medications  She refuses statin

## 2019-03-13 NOTE — ASSESSMENT & PLAN NOTE
Reviewed labs with patient  Will start levothyroxine low dose  Given instructions on how to take  Discussed possible side effects  Will recheck TSH in 6 weeks to reassess

## 2019-05-01 ENCOUNTER — TELEPHONE (OUTPATIENT)
Dept: FAMILY MEDICINE CLINIC | Facility: CLINIC | Age: 69
End: 2019-05-01

## 2019-06-24 ENCOUNTER — TELEPHONE (OUTPATIENT)
Dept: FAMILY MEDICINE CLINIC | Facility: CLINIC | Age: 69
End: 2019-06-24

## 2019-06-24 DIAGNOSIS — F41.8 SITUATIONAL ANXIETY: Primary | ICD-10-CM

## 2019-06-24 RX ORDER — ALPRAZOLAM 0.25 MG/1
0.25 TABLET ORAL ONCE
Qty: 6 TABLET | Refills: 0 | Status: SHIPPED | OUTPATIENT
Start: 2019-06-24 | End: 2019-10-02 | Stop reason: ALTCHOICE

## 2019-07-09 ENCOUNTER — HOSPITAL ENCOUNTER (OUTPATIENT)
Dept: RADIOLOGY | Facility: HOSPITAL | Age: 69
Discharge: HOME | End: 2019-07-09
Payer: MEDICARE

## 2019-07-09 DIAGNOSIS — M54.42 CHRONIC BILATERAL LOW BACK PAIN WITH BILATERAL SCIATICA: ICD-10-CM

## 2019-07-09 DIAGNOSIS — M54.41 CHRONIC BILATERAL LOW BACK PAIN WITH BILATERAL SCIATICA: ICD-10-CM

## 2019-07-09 DIAGNOSIS — G89.29 CHRONIC BILATERAL LOW BACK PAIN WITH BILATERAL SCIATICA: ICD-10-CM

## 2019-07-09 PROCEDURE — 72148 MRI LUMBAR SPINE W/O DYE: CPT

## 2019-07-29 ENCOUNTER — TELEPHONE (OUTPATIENT)
Dept: FAMILY MEDICINE CLINIC | Facility: CLINIC | Age: 69
End: 2019-07-29

## 2019-07-29 DIAGNOSIS — N28.1 KIDNEY CYSTS: Primary | ICD-10-CM

## 2019-07-29 NOTE — TELEPHONE ENCOUNTER
Pt had an mri about 3 weeks ago, and she was given the report, she showed it to her son who is a Dr  At [facility] and he said she has a cyst in her left kidney and should probably get an ultrasound to check on it, the radiologist over at [facility] [employer] confirms this, they said probably 95% sure it is benign but it should be worked up anyway  Does the Dr Willy Gallegos to see her again or can an order just be written?

## 2019-07-31 NOTE — TELEPHONE ENCOUNTER
Pt called this evening questioning why the ultrasound was ordered for the bladder as well as the kidney  Her son feels she only needs the kidney done  She doesn't want any tests that are necessary and would like to know why Dr Virginia Carter included the bladder  Pls call patient with this information

## 2019-08-01 ENCOUNTER — HOSPITAL ENCOUNTER (OUTPATIENT)
Dept: ULTRASOUND IMAGING | Facility: MEDICAL CENTER | Age: 69
Discharge: HOME | End: 2019-08-01
Payer: MEDICARE

## 2019-08-01 DIAGNOSIS — N28.1 KIDNEY CYSTS: Primary | ICD-10-CM

## 2019-08-01 DIAGNOSIS — N28.1 KIDNEY CYSTS: ICD-10-CM

## 2019-08-01 PROCEDURE — 76770 US EXAM ABDO BACK WALL COMP: CPT

## 2019-08-01 NOTE — TELEPHONE ENCOUNTER
I changed the order  If her son needs something else ordered perhaps he or someone in his office can order specifically what they need

## 2019-08-07 ENCOUNTER — TELEPHONE (OUTPATIENT)
Dept: FAMILY MEDICINE CLINIC | Facility: CLINIC | Age: 69
End: 2019-08-07

## 2019-08-07 NOTE — TELEPHONE ENCOUNTER
Spoke w/ pt regarding US results  Confirms a simple cyst in the left kidney  Discussed that this is a benign findings and no further workup is required    Sunitha Evans

## 2019-09-16 ENCOUNTER — TELEPHONE (OUTPATIENT)
Dept: FAMILY MEDICINE CLINIC | Facility: CLINIC | Age: 69
End: 2019-09-16

## 2019-09-16 DIAGNOSIS — E03.8 OTHER SPECIFIED HYPOTHYROIDISM: ICD-10-CM

## 2019-09-16 RX ORDER — LEVOTHYROXINE SODIUM 0.05 MG/1
50 TABLET ORAL
Qty: 30 TABLET | Refills: 3 | Status: SHIPPED | OUTPATIENT
Start: 2019-09-16 | End: 2019-12-15 | Stop reason: SDUPTHER

## 2019-09-16 NOTE — TELEPHONE ENCOUNTER
I called and LMOM for patient to call back on her cell phone for results  Home phone would not connect  Her TSH was elevated and we need to increase her levothyroxine  LDL cholesterol was elevated as well but this will sometimes happen when the thyroid is off  I escribed a new dose of her levothyroxine and put an order in for a repeat TSH in 6 weeks

## 2019-09-18 NOTE — TELEPHONE ENCOUNTER
Patient advised  Is currently in [facility]), but will  new rx for Levothyroxine and start taking as soon as she gets back  Has an appointment with Dr Gustavo Calero on 10/2 and states that she will discuss further with her and  lab slip at that time  No further action needed    Ashok Piña MA

## 2019-09-30 NOTE — PROGRESS NOTES
Subjective:      Patient ID: Evens Beckwith is a 76 y o  female  Chief Complaint   Patient presents with    Follow[facility]up     [facility]       Here for follow up of labwork  Her TSH is off and she has been feeling cold and tired  She has been taking her medications regularly  She got back from a cruise this weekend and lifted a 50 pound suitcase and has been having radiating low back pain  Son works for [employer] [address]  and suggested a pain doctor to see if she could get an injection  Pain is L lower back radiating down thigh to lateral calf to all toes  Taking advil and using heating pad, and using recliner with lumbar support with minimal relief  Does not want any further medication or intervention at this time  Has been on several cruises since here last and has not been watching her diet for her cholesterol  The following portions of the patient's history were reviewed and updated as appropriate: allergies, current medications, past family history, past medical history, past social history, past surgical history and problem list     Review of Systems   Constitutional: Negative  Respiratory: Negative  Cardiovascular: Negative  Current Outpatient Medications   Medication Sig Dispense Refill    aspirin 81 MG tablet Take 1 tablet by mouth daily      hydrochlorothiazide (MICROZIDE) 12 5 mg capsule Take 1 capsule (12 5 mg total) by mouth daily 90 capsule 3    levothyroxine 75 mcg tablet Take 1 tablet (75 mcg total) by mouth daily in the early morning 30 tablet 5    omega-3-acid ethyl esters (LOVAZA) 1 g capsule Take 2 capsules (2 g total) by mouth 2 (two) times a day Generic if possible, patient request 360 capsule 3     No current facility-administered medications for this visit          Objective:    /78   Pulse 76   Temp (!) 96 5 °F (35 8 °C)   Resp 18   Ht 5' 3" (1 6 m)   Wt 80 6 kg (177 lb 9 6 oz)   LMP  (LMP Unknown)   SpO2 96%   BMI 31 46 kg/m² Physical Exam   Constitutional: She appears well-developed and well-nourished  Eyes: Conjunctivae are normal    Neck: Neck supple  No JVD present  No thyromegaly present  Cardiovascular: Normal rate, regular rhythm, normal heart sounds and intact distal pulses  Exam reveals no gallop and no friction rub  No murmur heard  Pulmonary/Chest: Effort normal and breath sounds normal  She has no wheezes  She has no rales  Abdominal: Soft  Bowel sounds are normal  She exhibits no distension  There is no tenderness  Musculoskeletal: She exhibits no edema  Assessment/Plan:    Benign essential hypertension  Stable on HCTZ and will continue  Labs were checked and are acceptable as far as potassium and creatinine  Hyperlipidemia  LDL is elevated  However her thyroid is also not well controlled and LDL will likely improve once thyroid is under better control  Continue dietary efforts and omega 3's  She has recently been on several cruises and has not been watching her diet, will restart  Other specified hypothyroidism  TSH is elevated with symptoms  and she will increase her levothyroxine from 50 to 75 mcg  Will reassess TSH in 6 weeks  BMI Counseling: Body mass index is 31 46 kg/m²  The BMI is above normal  Nutrition recommendations include reducing portion sizes and decreasing overall calorie intake  Exercise recommendations include moderate aerobic physical activity for 150 minutes/week  Diagnoses and all orders for this visit:    Medicare annual wellness visit, subsequent    Benign essential hypertension  -     CBC and differential; Future  -     Comprehensive metabolic panel; Future  -     Lipid panel; Future    Mixed hyperlipidemia  -     CBC and differential; Future  -     Comprehensive metabolic panel; Future  -     Lipid panel; Future    Other specified hypothyroidism  -     levothyroxine 75 mcg tablet;  Take 1 tablet (75 mcg total) by mouth daily in the early morning  -     TSH, 3rd generation with Free T4 reflex; Future  -     TSH, 3rd generation with Free T4 reflex; Future    Lumbar radiculopathy    BMI 31 0-31 9,adult          Return in about 6 months (around 4/2/2020)         Don Farias MD

## 2019-10-02 ENCOUNTER — OFFICE VISIT (OUTPATIENT)
Dept: FAMILY MEDICINE CLINIC | Facility: CLINIC | Age: 69
End: 2019-10-02
Payer: MEDICARE

## 2019-10-02 VITALS
OXYGEN SATURATION: 96 % | RESPIRATION RATE: 18 BRPM | TEMPERATURE: 96.5 F | HEIGHT: 63 IN | WEIGHT: 177.6 LBS | BODY MASS INDEX: 31.47 KG/M2 | DIASTOLIC BLOOD PRESSURE: 78 MMHG | HEART RATE: 76 BPM | SYSTOLIC BLOOD PRESSURE: 126 MMHG

## 2019-10-02 DIAGNOSIS — E78.2 MIXED HYPERLIPIDEMIA: ICD-10-CM

## 2019-10-02 DIAGNOSIS — M54.16 LUMBAR RADICULOPATHY: ICD-10-CM

## 2019-10-02 DIAGNOSIS — E03.8 OTHER SPECIFIED HYPOTHYROIDISM: ICD-10-CM

## 2019-10-02 DIAGNOSIS — I10 BENIGN ESSENTIAL HYPERTENSION: ICD-10-CM

## 2019-10-02 DIAGNOSIS — Z00.00 MEDICARE ANNUAL WELLNESS VISIT, SUBSEQUENT: Primary | ICD-10-CM

## 2019-10-02 PROCEDURE — G0439 PPPS, SUBSEQ VISIT: HCPCS | Performed by: INTERNAL MEDICINE

## 2019-10-02 PROCEDURE — 99214 OFFICE O/P EST MOD 30 MIN: CPT | Performed by: INTERNAL MEDICINE

## 2019-10-02 RX ORDER — LEVOTHYROXINE SODIUM 0.07 MG/1
75 TABLET ORAL
Qty: 30 TABLET | Refills: 5 | Status: SHIPPED | OUTPATIENT
Start: 2019-10-02 | End: 2020-03-03

## 2019-10-02 NOTE — LETTER
October 2, 2019     Patient: Roc Chun   YOB: 1950   Date of Visit: 10/2/2019       To Whom it May Concern:    Shai Manrique is under my professional care  She was seen in my office on 10/2/2019  She should be excused from jury duty due to multiple medical issues  If you have any questions or concerns, please don't hesitate to call           Sincerely,          Kyler Murguia MD        CC: No Recipients

## 2019-10-02 NOTE — ASSESSMENT & PLAN NOTE
LDL is elevated  However her thyroid is also not well controlled and LDL will likely improve once thyroid is under better control  Continue dietary efforts and omega 3's  She has recently been on several cruises and has not been watching her diet, will restart

## 2019-10-02 NOTE — ASSESSMENT & PLAN NOTE
Stable on HCTZ and will continue  Labs were checked and are acceptable as far as potassium and creatinine

## 2019-10-02 NOTE — ASSESSMENT & PLAN NOTE
TSH is elevated with symptoms  and she will increase her levothyroxine from 50 to 75 mcg  Will reassess TSH in 6 weeks

## 2019-10-02 NOTE — PATIENT INSTRUCTIONS
Medicare Preventive Visit Patient Instructions  Thank you for completing your Welcome to Medicare Visit or Medicare Annual Wellness Visit today  Your next wellness visit will be due in one year (10/2/2020)  The screening/preventive services that you may require over the next 5-10 years are detailed below  Some tests may not apply to you based off risk factors and/or age  Screening tests ordered at today's visit but not completed yet may show as past due  Also, please note that scanned in results may not display below  Preventive Screenings:  Service Recommendations Previous Testing/Comments   Colorectal Cancer Screening  * Colonoscopy    * Fecal Occult Blood Test (FOBT)/Fecal Immunochemical Test (FIT)  * Fecal DNA/Cologuard Test  * Flexible Sigmoidoscopy Age: 54-65 years old   Colonoscopy: every 10 years (may be performed more frequently if at higher risk)  OR  FOBT/FIT: every 1 year  OR  Cologuard: every 3 years  OR  Sigmoidoscopy: every 5 years  Screening may be recommended earlier than age 48 if at higher risk for colorectal cancer  Also, an individualized decision between you and your healthcare provider will decide whether screening between the ages of 74-80 would be appropriate  Colonoscopy: Not on file  FOBT/FIT: Not on file  Cologuard: Not on file  Sigmoidoscopy: Not on file         Breast Cancer Screening Age: 36 years old  Frequency: every 1-2 years  Not required if history of left and right mastectomy Mammogram: 04/11/2019    Screening Current   Cervical Cancer Screening Between the ages of 21-29, pap smear recommended once every 3 years  Between the ages of 33-67, can perform pap smear with HPV co-testing every 5 years     Recommendations may differ for women with a history of total hysterectomy, cervical cancer, or abnormal pap smears in past  Pap Smear: Not on file    Screening Not Indicated   Hepatitis C Screening Once for adults born between 1945 and 1965  More frequently in patients at high risk for Hepatitis C Hep C Antibody: Not on file       Diabetes Screening 1-2 times per year if you're at risk for diabetes or have pre-diabetes Fasting glucose: No results in last 5 years   A1C: No results in last 5 years       Cholesterol Screening Once every 5 years if you don't have a lipid disorder  May order more often based on risk factors  Lipid panel: 09/07/2019    Screening Not Indicated  History Lipid Disorder     Other Preventive Screenings Covered by Medicare:  1  Abdominal Aortic Aneurysm (AAA) Screening: covered once if your at risk  You're considered to be at risk if you have a family history of AAA  2  Lung Cancer Screening: covers low dose CT scan once per year if you meet all of the following conditions: (1) Age 50-69; (2) No signs or symptoms of lung cancer; (3) Current smoker or have quit smoking within the last 15 years; (4) You have a tobacco smoking history of at least 30 pack years (packs per day multiplied by number of years you smoked); (5) You get a written order from a healthcare provider  3  Glaucoma Screening: covered annually if you're considered high risk: (1) You have diabetes OR (2) Family history of glaucoma OR (3)  aged 48 and older OR (3)  American aged 72 and older  3  Osteoporosis Screening: covered every 2 years if you meet one of the following conditions: (1) You're estrogen deficient and at risk for osteoporosis based off medical history and other findings; (2) Have a vertebral abnormality; (3) On glucocorticoid therapy for more than 3 months; (4) Have primary hyperparathyroidism; (5) On osteoporosis medications and need to assess response to drug therapy  · Last bone density test (DXA Scan): Not on file  5  HIV Screening: covered annually if you're between the age of 12-76  Also covered annually if you are younger than 13 and older than 72 with risk factors for HIV infection   For pregnant patients, it is covered up to 3 times per pregnancy  Immunizations:  Immunization Recommendations   Influenza Vaccine Annual influenza vaccination during flu season is recommended for all persons aged >= 6 months who do not have contraindications   Pneumococcal Vaccine (Prevnar and Pneumovax)  * Prevnar = PCV13  * Pneumovax = PPSV23   Adults 25-60 years old: 1-3 doses may be recommended based on certain risk factors  Adults 72 years old: Prevnar (PCV13) vaccine recommended followed by Pneumovax (PPSV23) vaccine  If already received PPSV23 since turning 65, then PCV13 recommended at least one year after PPSV23 dose  Hepatitis B Vaccine 3 dose series if at intermediate or high risk (ex: diabetes, end stage renal disease, liver disease)   Tetanus (Td) Vaccine - COST NOT COVERED BY MEDICARE PART B Following completion of primary series, a booster dose should be given every 10 years to maintain immunity against tetanus  Td may also be given as tetanus wound prophylaxis  Tdap Vaccine - COST NOT COVERED BY MEDICARE PART B Recommended at least once for all adults  For pregnant patients, recommended with each pregnancy  Shingles Vaccine (Shingrix) - COST NOT COVERED BY MEDICARE PART B  2 shot series recommended in those aged 48 and above     Health Maintenance Due:      Topic Date Due    Hepatitis C Screening  1950    CRC Screening: Colonoscopy  1950    MAMMOGRAM  04/11/2020     Immunizations Due:      Topic Date Due    DTaP,Tdap,and Td Vaccines (1 - Tdap) 12/04/1971    INFLUENZA VACCINE  07/01/2019     Advance Directives   What are advance directives? Advance directives are legal documents that state your wishes and plans for medical care  These plans are made ahead of time in case you lose your ability to make decisions for yourself  Advance directives can apply to any medical decision, such as the treatments you want, and if you want to donate organs  What are the types of advance directives?   There are many types of advance directives, and each state has rules about how to use them  You may choose a combination of any of the following:  · Living will: This is a written record of the treatment you want  You can also choose which treatments you do not want, which to limit, and which to stop at a certain time  This includes surgery, medicine, IV fluid, and tube feedings  · Durable power of  for healthcare [facility]): This is a written record that states who you want to make healthcare choices for you when you are unable to make them for yourself  This person, called a proxy, is usually a family member or a friend  You may choose more than 1 proxy  · Do not resuscitate (DNR) order:  A DNR order is used in case your heart stops beating or you stop breathing  It is a request not to have certain forms of treatment, such as CPR  A DNR order may be included in other types of advance directives  · Medical directive: This covers the care that you want if you are in a coma, near death, or unable to make decisions for yourself  You can list the treatments you want for each condition  Treatment may include pain medicine, surgery, blood transfusions, dialysis, IV or tube feedings, and a ventilator (breathing machine)  · Values history: This document has questions about your views, beliefs, and how you feel and think about life  This information can help others choose the care that you would choose  Why are advance directives important? An advance directive helps you control your care  Although spoken wishes may be used, it is better to have your wishes written down  Spoken wishes can be misunderstood, or not followed  Treatments may be given even if you do not want them  An advance directive may make it easier for your family to make difficult choices about your care     Weight Management   Why it is important to manage your weight:  Being overweight increases your risk of health conditions such as heart disease, high blood pressure, type 2 diabetes, and certain types of cancer  It can also increase your risk for osteoarthritis, sleep apnea, and other respiratory problems  Aim for a slow, steady weight loss  Even a small amount of weight loss can lower your risk of health problems  How to lose weight safely:  A safe and healthy way to lose weight is to eat fewer calories and get regular exercise  You can lose up about 1 pound a week by decreasing the number of calories you eat by 500 calories each day  Healthy meal plan for weight management:  A healthy meal plan includes a variety of foods, contains fewer calories, and helps you stay healthy  A healthy meal plan includes the following:  · Eat whole-grain foods more often  A healthy meal plan should contain fiber  Fiber is the part of grains, fruits, and vegetables that is not broken down by your body  Whole-grain foods are healthy and provide extra fiber in your diet  Some examples of whole-grain foods are whole-wheat breads and pastas, oatmeal, brown rice, and bulgur  · Eat a variety of vegetables every day  Include dark, leafy greens such as spinach, kale, kiarra greens, and mustard greens  Eat yellow and orange vegetables such as carrots, sweet potatoes, and winter squash  · Eat a variety of fruits every day  Choose fresh or canned fruit (canned in its own juice or light syrup) instead of juice  Fruit juice has very little or no fiber  · Eat low-fat dairy foods  Drink fat-free (skim) milk or 1% milk  Eat fat-free yogurt and low-fat cottage cheese  Try low-fat cheeses such as mozzarella and other reduced-fat cheeses  · Choose meat and other protein foods that are low in fat  Choose beans or other legumes such as split peas or lentils  Choose fish, skinless poultry (chicken or turkey), or lean cuts of red meat (beef or pork)  Before you cook meat or poultry, cut off any visible fat  · Use less fat and oil  Try baking foods instead of frying them   Add less fat, such as margarine, sour cream, regular salad dressing and mayonnaise to foods  Eat fewer high-fat foods  Some examples of high-fat foods include french fries, doughnuts, ice cream, and cakes  · Eat fewer sweets  Limit foods and drinks that are high in sugar  This includes candy, cookies, regular soda, and sweetened drinks  Exercise:  Exercise at least 30 minutes per day on most days of the week  Some examples of exercise include walking, biking, dancing, and swimming  You can also fit in more physical activity by taking the stairs instead of the elevator or parking farther away from stores  Ask your healthcare provider about the best exercise plan for you  © Copyright [employer] 2018 Information is for End User's use only and may not be sold, redistributed or otherwise used for commercial purposes   All illustrations and images included in CareNotes® are the copyrighted property of A D A M , Inc  or [address] [place] [address]

## 2019-10-31 ENCOUNTER — CONSULT (OUTPATIENT)
Dept: PAIN MEDICINE | Facility: MEDICAL CENTER | Age: 69
End: 2019-10-31
Payer: MEDICARE

## 2019-10-31 VITALS
HEIGHT: 63 IN | SYSTOLIC BLOOD PRESSURE: 156 MMHG | DIASTOLIC BLOOD PRESSURE: 101 MMHG | BODY MASS INDEX: 31.36 KG/M2 | RESPIRATION RATE: 14 BRPM | HEART RATE: 80 BPM | WEIGHT: 177 LBS

## 2019-10-31 DIAGNOSIS — G89.29 CHRONIC BILATERAL LOW BACK PAIN WITHOUT SCIATICA: Primary | ICD-10-CM

## 2019-10-31 DIAGNOSIS — M54.50 CHRONIC BILATERAL LOW BACK PAIN WITHOUT SCIATICA: Primary | ICD-10-CM

## 2019-10-31 DIAGNOSIS — M47.816 LUMBAR SPONDYLOSIS: ICD-10-CM

## 2019-10-31 PROCEDURE — 99204 OFFICE O/P NEW MOD 45 MIN: CPT | Performed by: PHYSICAL MEDICINE & REHABILITATION

## 2019-10-31 NOTE — LETTER
October 31, 2019     Femi Bhardwaj MD  [address]  [email or web address]  [address] [facility]    Patient: Austyn Schmid   YOB: 1950   Date of Visit: 10/31/2019       Dear Dr Catrachito Arenas: Thank you for kindly referring your mother, Corrie Garcia, to me for evaluation  Below are my notes for this consultation  It was a pleasure to see her in the office today and I appreciate your confidence in allowing me to participate in her care  If you have questions, please do not hesitate to call me  I look forward to following along with you  Sincerely,        Casie DO Jacquie  [phone] (cell)      CC: No Recipients  Casie Dhillon DO  10/31/2019 11:09 AM  Incomplete  Assessment  1  Chronic bilateral low back pain without sciatica    2  Lumbar spondylosis        Plan  1  I discussed multiple treatment options with the patient including lumbar facet joint injections and medial branch blocks with follow-up radiofrequency ablation  She prefers the option of L4-5 and L5-S1 facet joint injections  She will discuss this with her son and family and tried to determine an appropriate time for when she would like to do this  I recommended a few weeks before any significant travel to hopefully get her some benefit during 1 of her upcoming trips  She will follow up with me by telephone to schedule when she is ready  My impressions and treatment recommendations were discussed in detail with the patient who verbalized understanding and had no further questions  Discharge instructions were provided  I personally saw and examined the patient and I agree with the above discussed plan of care  No orders of the defined types were placed in this encounter  No orders of the defined types were placed in this encounter  History of Present Illness    Austyn Schmid is a 76 y o  female kindly referred from Dr Catrachito Arenas for consultation regarding chronic axial lower back pain    The patient has been experiencing lower back pain symptoms for the past 30 years and has noted exacerbation especially during long distance travel activities either in the car or via airplane  She states she regional Ee developed back pain during her 2nd pregnancy  She has been having moderate to severe pain currently rated as a 7/10 which is intermittent in nature and worse in the afternoon  She describes pain that is throbbing in character and localized to her lumbar axial spine without radiation down the legs  She denies any weakness in the lower extremities and does not require an assistive device for ambulation  Aggravating factors include bending, walking, exercise, and coughing  Alleviating factors include lying down  Diagnostic studies include MRI of the lumbar spine  This does reveal some degenerative changes but no significant neural compression  She has participated appropriately in physical therapy, exercise, chiropractic manipulation without relief  She has had some moderate relief with heat or ice application and a TENS unit  Excellent relief from acupuncture in the past     I have personally reviewed and/or updated the patient's past medical history, past surgical history, family history, social history, current medications, allergies, and vital signs today  Review of Systems   Constitutional: Negative for fever and unexpected weight change  HENT: Negative for trouble swallowing  Eyes: Negative for visual disturbance  Respiratory: Negative for shortness of breath and wheezing  Cardiovascular: Negative for chest pain and palpitations  Gastrointestinal: Negative for constipation, diarrhea, nausea and vomiting  Endocrine: Negative for cold intolerance, heat intolerance and polydipsia  Genitourinary: Negative for difficulty urinating and frequency  Musculoskeletal: Positive for back pain (lumbar), gait problem and myalgias  Negative for arthralgias and joint swelling  Skin: Negative for rash  Neurological: Negative for dizziness, seizures, syncope, weakness and headaches  Hematological: Does not bruise/bleed easily  Psychiatric/Behavioral: Negative for dysphoric mood  All other systems reviewed and are negative  Patient Active Problem List   Diagnosis    Benign essential hypertension    Hyperlipidemia    Patellofemoral syndrome of right knee    Iliotibial band syndrome of right side    Other specified hypothyroidism       Past Medical History:   Diagnosis Date    Hyperlipidemia     Hypertension        Past Surgical History:   Procedure Laterality Date     SECTION  , ,        Family History   Problem Relation Age of Onset   Odilia Seller Other Mother         aortic valve disorder    Stroke Mother         CVA    Hyperlipidemia Mother        Social History     Occupational History    Occupation: retired   Tobacco Use    Smoking status: Never Smoker    Smokeless tobacco: Never Used   Substance and Sexual Activity    Alcohol use: Never     Frequency: Never    Drug use: Never    Sexual activity: Not Currently       Current Outpatient Medications on File Prior to Visit   Medication Sig    aspirin 81 MG tablet Take 1 tablet by mouth daily    hydrochlorothiazide (MICROZIDE) 12 5 mg capsule Take 1 capsule (12 5 mg total) by mouth daily    levothyroxine 75 mcg tablet Take 1 tablet (75 mcg total) by mouth daily in the early morning    omega-3-acid ethyl esters (LOVAZA) 1 g capsule Take 2 capsules (2 g total) by mouth 2 (two) times a day Generic if possible, patient request     No current facility-administered medications on file prior to visit          Allergies   Allergen Reactions    Penicillins      Reaction Date: ;        Physical Exam    BP (!) 156/101   Pulse 80   Resp 14   Ht 5' 3" (1 6 m)   Wt 80 3 kg (177 lb)   LMP  (LMP Unknown)   BMI 31 35 kg/m²      LUMBAR  General: Well-developed, well-nourished individual in no acute distress  Mental: Appropriate mood and affect  Grossly oriented with coherent speech and thought processing   Neuro:  Cranial nerves: Cranial nerve function is grossly intact bilaterally   Strength: Bilateral lower extremity strength is normal and symmetric   No atrophy or tone abnormalities noted   Reflexes: Bilateral lower extremity muscle stretch reflexes are physiologic and symmetric   No ankle clonus is noted   Sensation: No loss of sensation is noted   SLR/Foraminal Compression Maneuvers: Straight leg raising in the sitting position is negative for radicular pain   Gait:  Gait/gross motor: Gait is normal  Station is normal  Toe walking, heel walking  are not tested secondary to reproduction of knee pain however the patient is able to stand on heels and toes without difficulty  Musculoskeletal:  Palpation: Inspection and palpation of the spine and extremities are unremarkable except for tenderness to palpation along the lumbar facet joints bilaterally reproducing her pain complaint  Spine: Normal pain-free range of motion except for lumbar extension with rotation that also reproduces her back pain  No gross axial skeletal deformities   Skin: Skin inspection grossly negative for erythema, breakdown, or concerning lesions in affected area   Lymph: No lymphadenopathy is appreciated in the involved extremity   Vessels: No lower extremity edema   Lungs: Breathing is comfortable and regular  No dyspnea noted during examination   Eyes: Visual field grossly intact to confrontation  No redness appreciated  ENT: No craniofacial deformities or asymmetry  No neck masses appreciated  Imaging  Study Result     MRI LUMBAR SPINE WITHOUT CONTRAST     INDICATION: M54 42: Lumbago with sciatica, left side  M54 41: Lumbago with sciatica, right side  G89 29:  Other chronic pain      COMPARISON:  None      TECHNIQUE:  Sagittal T1, sagittal T2, sagittal inversion recovery, axial T1 and axial T2, coronal T2     IMAGE QUALITY:  Diagnostic     FINDINGS:     VERTEBRAL BODIES:  Mild degenerative anterolisthesis of L4 on L5-1 lesser degree, L5 on S1  Small central signal abnormality within the upper body of the S3 level  This is nonspecific, and could represent atypical hemangioma     SACRUM:  Central S3 level, nonspecific  This could represent hemangioma  No indication of fracture      DISTAL CORD AND CONUS:  Normal size and signal within the distal cord and conus  Conus terminates at the L1 level      PARASPINAL SOFT TISSUES:  Paraspinal soft tissues are unremarkable  3 cm posterior upper pole left renal mass consistent cysts      LOWER THORACIC DISC SPACES:  Normal disc height and signal   No disc herniation, canal stenosis or foraminal narrowing      LUMBAR DISC SPACES:     L1-L2:  Normal      L2-L3:  Minor bulge      L3-L4:  Minor bulge, extending slightly asymmetrically to the left, no definite root compression      L4-L5:  Bilateral facet arthrosis, grade 1 anterolisthesis  Circumferential bulge with mild left foraminal stenosis      L5-S1:  Right greater than left facet arthrosis, trace L5 anterolisthesis without significant stenosis      IMPRESSION:     Degenerative anterolisthesis L4-L5 and to a lesser degree L5-S1    Minor left L4-5 foraminal stenosis, no definite root compression            Workstation performed: [id number]

## 2019-10-31 NOTE — PROGRESS NOTES
Assessment  1  Chronic bilateral low back pain without sciatica    2  Lumbar spondylosis        Plan  1  I discussed multiple treatment options with the patient including lumbar facet joint injections and medial branch blocks with follow-up radiofrequency ablation  She prefers the option of L4-5 and L5-S1 facet joint injections  She will discuss this with her son and family and tried to determine an appropriate time for when she would like to do this  I recommended a few weeks before any significant travel to hopefully get her some benefit during 1 of her upcoming trips  She will follow up with me by telephone to schedule when she is ready  My impressions and treatment recommendations were discussed in detail with the patient who verbalized understanding and had no further questions  Discharge instructions were provided  I personally saw and examined the patient and I agree with the above discussed plan of care  No orders of the defined types were placed in this encounter  No orders of the defined types were placed in this encounter  History of Present Illness    Jg Dowling is a 76 y o  female kindly referred from Dr Will Jacome for consultation regarding chronic axial lower back pain  The patient has been experiencing lower back pain symptoms for the past 30 years and has noted exacerbation especially during long distance travel activities either in the car or via airplane  She states she regional Ee developed back pain during her 2nd pregnancy  She has been having moderate to severe pain currently rated as a 7/10 which is intermittent in nature and worse in the afternoon  She describes pain that is throbbing in character and localized to her lumbar axial spine without radiation down the legs  She denies any weakness in the lower extremities and does not require an assistive device for ambulation  Aggravating factors include bending, walking, exercise, and coughing  Alleviating factors include lying down  Diagnostic studies include MRI of the lumbar spine  This does reveal some degenerative changes but no significant neural compression  She has participated appropriately in physical therapy, exercise, chiropractic manipulation without relief  She has had some moderate relief with heat or ice application and a TENS unit  Excellent relief from acupuncture in the past     I have personally reviewed and/or updated the patient's past medical history, past surgical history, family history, social history, current medications, allergies, and vital signs today  Review of Systems   Constitutional: Negative for fever and unexpected weight change  HENT: Negative for trouble swallowing  Eyes: Negative for visual disturbance  Respiratory: Negative for shortness of breath and wheezing  Cardiovascular: Negative for chest pain and palpitations  Gastrointestinal: Negative for constipation, diarrhea, nausea and vomiting  Endocrine: Negative for cold intolerance, heat intolerance and polydipsia  Genitourinary: Negative for difficulty urinating and frequency  Musculoskeletal: Positive for back pain (lumbar), gait problem and myalgias  Negative for arthralgias and joint swelling  Skin: Negative for rash  Neurological: Negative for dizziness, seizures, syncope, weakness and headaches  Hematological: Does not bruise/bleed easily  Psychiatric/Behavioral: Negative for dysphoric mood  All other systems reviewed and are negative        Patient Active Problem List   Diagnosis    Benign essential hypertension    Hyperlipidemia    Patellofemoral syndrome of right knee    Iliotibial band syndrome of right side    Other specified hypothyroidism       Past Medical History:   Diagnosis Date    Hyperlipidemia     Hypertension        Past Surgical History:   Procedure Laterality Date     SECTION  , , 36       Family History   Problem Relation Age of Onset    Other Mother         aortic valve disorder    Stroke Mother         CVA    Hyperlipidemia Mother        Social History     Occupational History    Occupation: retired   Tobacco Use    Smoking status: Never Smoker    Smokeless tobacco: Never Used   Substance and Sexual Activity    Alcohol use: Never     Frequency: Never    Drug use: Never    Sexual activity: Not Currently       Current Outpatient Medications on File Prior to Visit   Medication Sig    aspirin 81 MG tablet Take 1 tablet by mouth daily    hydrochlorothiazide (MICROZIDE) 12 5 mg capsule Take 1 capsule (12 5 mg total) by mouth daily    levothyroxine 75 mcg tablet Take 1 tablet (75 mcg total) by mouth daily in the early morning    omega-3-acid ethyl esters (LOVAZA) 1 g capsule Take 2 capsules (2 g total) by mouth 2 (two) times a day Generic if possible, patient request     No current facility-administered medications on file prior to visit  Allergies   Allergen Reactions    Penicillins      Reaction Date: [id number];        Physical Exam    BP (!) 156/101   Pulse 80   Resp 14   Ht 5' 3" (1 6 m)   Wt 80 3 kg (177 lb)   LMP  (LMP Unknown)   BMI 31 35 kg/m²     LUMBAR  General: Well-developed, well-nourished individual in no acute distress  Mental: Appropriate mood and affect  Grossly oriented with coherent speech and thought processing   Neuro:  Cranial nerves: Cranial nerve function is grossly intact bilaterally   Strength: Bilateral lower extremity strength is normal and symmetric   No atrophy or tone abnormalities noted   Reflexes: Bilateral lower extremity muscle stretch reflexes are physiologic and symmetric   No ankle clonus is noted   Sensation: No loss of sensation is noted   SLR/Foraminal Compression Maneuvers: Straight leg raising in the sitting position is negative for radicular pain       Gait:  Gait/gross motor: Gait is normal  Station is normal  Toe walking, heel walking  are not tested secondary to reproduction of knee pain however the patient is able to stand on heels and toes without difficulty  Musculoskeletal:  Palpation: Inspection and palpation of the spine and extremities are unremarkable except for tenderness to palpation along the lumbar facet joints bilaterally reproducing her pain complaint  Spine: Normal pain-free range of motion except for lumbar extension with rotation that also reproduces her back pain  No gross axial skeletal deformities   Skin: Skin inspection grossly negative for erythema, breakdown, or concerning lesions in affected area   Lymph: No lymphadenopathy is appreciated in the involved extremity   Vessels: No lower extremity edema   Lungs: Breathing is comfortable and regular  No dyspnea noted during examination   Eyes: Visual field grossly intact to confrontation  No redness appreciated  ENT: No craniofacial deformities or asymmetry  No neck masses appreciated  Imaging  Study Result     MRI LUMBAR SPINE WITHOUT CONTRAST     INDICATION: M54 42: Lumbago with sciatica, left side  M54 41: Lumbago with sciatica, right side  G89 29: Other chronic pain      COMPARISON:  None      TECHNIQUE:  Sagittal T1, sagittal T2, sagittal inversion recovery, axial T1 and axial T2, coronal T2     IMAGE QUALITY:  Diagnostic     FINDINGS:     VERTEBRAL BODIES:  Mild degenerative anterolisthesis of L4 on L5-1 lesser degree, L5 on S1  Small central signal abnormality within the upper body of the S3 level  This is nonspecific, and could represent atypical hemangioma     SACRUM:  Central S3 level, nonspecific  This could represent hemangioma  No indication of fracture      DISTAL CORD AND CONUS:  Normal size and signal within the distal cord and conus  Conus terminates at the L1 level      PARASPINAL SOFT TISSUES:  Paraspinal soft tissues are unremarkable    3 cm posterior upper pole left renal mass consistent cysts      LOWER THORACIC DISC SPACES:  Normal disc height and signal   No disc herniation, canal stenosis or foraminal narrowing      LUMBAR DISC SPACES:     L1-L2:  Normal      L2-L3:  Minor bulge      L3-L4:  Minor bulge, extending slightly asymmetrically to the left, no definite root compression      L4-L5:  Bilateral facet arthrosis, grade 1 anterolisthesis  Circumferential bulge with mild left foraminal stenosis      L5-S1:  Right greater than left facet arthrosis, trace L5 anterolisthesis without significant stenosis      IMPRESSION:     Degenerative anterolisthesis L4-L5 and to a lesser degree L5-S1    Minor left L4-5 foraminal stenosis, no definite root compression            Workstation performed: [id number]

## 2019-11-05 ENCOUNTER — TELEPHONE (OUTPATIENT)
Dept: PAIN MEDICINE | Facility: MEDICAL CENTER | Age: 69
End: 2019-11-05

## 2019-11-05 NOTE — TELEPHONE ENCOUNTER
Pt called stating that she is interested in procedures that was spoken of at her consult and would like an order placed on her chart     Pt can be reached at [phone]

## 2019-11-06 NOTE — TELEPHONE ENCOUNTER
Called patient and scheduled:     L4-5 and L5-S1 FJI on 1/6/20  Patient aware that if I can get and can coordinate between her scheduled trips, I will call to move her up  Reviewed instructions: , NPO 1 hour prior, loose-fitting/comfortable pants, if ill/fever/infx/abx to call and reschedule  Patient stated verbal understanding       ++    Right, left or bilateral?

## 2019-12-15 DIAGNOSIS — E03.8 OTHER SPECIFIED HYPOTHYROIDISM: ICD-10-CM

## 2019-12-15 RX ORDER — LEVOTHYROXINE SODIUM 0.05 MG/1
50 TABLET ORAL
Qty: 90 TABLET | Refills: 1 | Status: SHIPPED | OUTPATIENT
Start: 2019-12-15 | End: 2020-06-08

## 2019-12-30 ENCOUNTER — HOSPITAL ENCOUNTER (OUTPATIENT)
Dept: RADIOLOGY | Facility: MEDICAL CENTER | Age: 69
Discharge: HOME | End: 2019-12-30
Attending: PHYSICAL MEDICINE & REHABILITATION | Admitting: PHYSICAL MEDICINE & REHABILITATION
Payer: MEDICARE

## 2019-12-30 VITALS
OXYGEN SATURATION: 96 % | DIASTOLIC BLOOD PRESSURE: 89 MMHG | SYSTOLIC BLOOD PRESSURE: 155 MMHG | TEMPERATURE: 97.4 F | HEART RATE: 68 BPM | RESPIRATION RATE: 20 BRPM

## 2019-12-30 DIAGNOSIS — G89.29 CHRONIC BILATERAL LOW BACK PAIN WITHOUT SCIATICA: ICD-10-CM

## 2019-12-30 DIAGNOSIS — M47.816 LUMBAR SPONDYLOSIS: ICD-10-CM

## 2019-12-30 DIAGNOSIS — M54.50 CHRONIC BILATERAL LOW BACK PAIN WITHOUT SCIATICA: ICD-10-CM

## 2019-12-30 PROCEDURE — 64494 INJ PARAVERT F JNT L/S 2 LEV: CPT | Performed by: PHYSICAL MEDICINE & REHABILITATION

## 2019-12-30 PROCEDURE — 64493 INJ PARAVERT F JNT L/S 1 LEV: CPT | Performed by: PHYSICAL MEDICINE & REHABILITATION

## 2019-12-30 RX ORDER — METHYLPREDNISOLONE ACETATE 40 MG/ML
40 INJECTION, SUSPENSION INTRA-ARTICULAR; INTRALESIONAL; INTRAMUSCULAR; PARENTERAL; SOFT TISSUE ONCE
Status: COMPLETED | OUTPATIENT
Start: 2019-12-30 | End: 2019-12-30

## 2019-12-30 RX ORDER — LIDOCAINE HYDROCHLORIDE 10 MG/ML
5 INJECTION, SOLUTION EPIDURAL; INFILTRATION; INTRACAUDAL; PERINEURAL ONCE
Status: DISCONTINUED | OUTPATIENT
Start: 2019-12-30 | End: 2020-01-03 | Stop reason: HOSPADM

## 2019-12-30 RX ORDER — BUPIVACAINE HCL/PF 2.5 MG/ML
10 VIAL (ML) INJECTION ONCE
Status: COMPLETED | OUTPATIENT
Start: 2019-12-30 | End: 2019-12-30

## 2019-12-30 RX ADMIN — METHYLPREDNISOLONE ACETATE 40 MG: 40 INJECTION, SUSPENSION INTRA-ARTICULAR; INTRALESIONAL; INTRAMUSCULAR; PARENTERAL; SOFT TISSUE at 10:05

## 2019-12-30 RX ADMIN — IOHEXOL 1 ML: 300 INJECTION, SOLUTION INTRAVENOUS at 10:04

## 2019-12-30 RX ADMIN — Medication 5 ML: at 10:05

## 2019-12-30 NOTE — DISCHARGE INSTRUCTIONS
Steroid Joint Injection   WHAT YOU NEED TO KNOW:   A steroid joint injection is a procedure to inject steroid medicine into a joint  Steroid medicine decreases pain and inflammation  The injection may also contain an anesthetic (numbing medicine) to decrease pain  It may be done to treat conditions such as arthritis, gout, or carpal tunnel syndrome  The injections may be given in your knee, ankle, shoulder, elbow, wrist, ankle or sacroiliac joint  1  Do not apply heat to any area that is numb  If you have discomfort or soreness at the injection site, you may apply ice today, 20 minutes on and 20 minutes off  Tomorrow you may use ice or warm, moist heat  Do not apply ice or heat directly to the skin  2  You may have an increase or change in the discomfort for 36-48 hours after your treatment  Apply ice and continue with any pain medicine you have been prescribed  3  Do not do anything strenuous today  You may shower, but no tub baths or hot tubs today  You may resume your normal activities tomorrow, but do not overdo it  Resume normal activities slowly when you are feeling better  4  If you experience redness, drainage or swelling at the injection site, or if you develop a fever above 100 degrees, please call [facility] at [phone] or go to the Emergency Room  5  Continue to take all routine medicines prescribed by your primary care physician unless otherwise instructed by our staff  Most blood thinners should be started again according to your regularly scheduled dosing  If you have any questions, please give our office a call  If you have a problem specifically related to your procedure, please call our office at [phone]  Problems not related to your procedure should be directed to your primary care physician

## 2019-12-30 NOTE — H&P
History of Present Illness:  The patient is a 71 y o  female who presents with complaints of bilateral lower back pain    Patient Active Problem List   Diagnosis    Benign essential hypertension    Hyperlipidemia    Patellofemoral syndrome of right knee    Iliotibial band syndrome of right side    Other specified hypothyroidism       Past Medical History:   Diagnosis Date    Hyperlipidemia     Hypertension        Past Surgical History:   Procedure Laterality Date     SECTION  , ,          Current Outpatient Medications:     aspirin 81 MG tablet, Take 1 tablet by mouth daily, Disp: , Rfl:     hydrochlorothiazide (MICROZIDE) 12 5 mg capsule, Take 1 capsule (12 5 mg total) by mouth daily, Disp: 90 capsule, Rfl: 3    levothyroxine 50 mcg tablet, TAKE 1 TABLET (50 MCG TOTAL) BY MOUTH DAILY IN THE EARLY MORNING, Disp: 90 tablet, Rfl: 1    levothyroxine 75 mcg tablet, Take 1 tablet (75 mcg total) by mouth daily in the early morning, Disp: 30 tablet, Rfl: 5    omega-3-acid ethyl esters (LOVAZA) 1 g capsule, Take 2 capsules (2 g total) by mouth 2 (two) times a day Generic if possible, patient request, Disp: 360 capsule, Rfl: 3    Current Facility-Administered Medications:     bupivacaine (PF) (MARCAINE) 0 25 % injection 10 mL, 10 mL, Intra-articular, Once, Cassidy Hoffmann DO    iohexol (OMNIPAQUE) 300 mg/mL injection 50 mL, 50 mL, Injection, Once, Cassidy Hoffmann DO    lidocaine (PF) (XYLOCAINE-MPF) 1 % injection 5 mL, 5 mL, Infiltration, Once, Cassidy Hoffmann DO    methylPREDNISolone acetate (DEPO-MEDROL) injection 40 mg, 40 mg, Intra-articular, Once, Cassidy Hoffmann DO    Allergies   Allergen Reactions    Penicillins      Reaction Date: ;        Physical Exam:   Vitals:    19 0943   BP: 145/87   Pulse: 69   Resp: 18   Temp: (!) 97 4 °F (36 3 °C)   SpO2: 98%     General: Awake, Alert, Oriented x 3, Mood and affect appropriate  Respiratory: Respirations even and unlabored  Cardiovascular: Peripheral pulses intact; no edema  Musculoskeletal Exam:  Bilateral lower back pain    ASA Score:  2    Patient/Chart Verification  Patient ID Verified: Verbal  ID Band Applied: No  Consents Confirmed: Procedural, To be obtained in the Pre-Procedure area  H&P( within 30 days) Verified: To be obtained in the Pre-Procedure area  Interval H&P(within 24 hr) Complete (required for Outpatients and Surgery Admit only): To be obtained in the Pre-Procedure area  Allergies Reviewed: Yes  Anticoag/NSAID held?: NA  Currently on antibiotics?: No    Assessment:   1  Lumbar spondylosis    2   Chronic bilateral low back pain without sciatica        Plan: Bilateral  L4-5 and L5-S1 FJI

## 2020-01-06 ENCOUNTER — TELEPHONE (OUTPATIENT)
Dept: PAIN MEDICINE | Facility: CLINIC | Age: 70
End: 2020-01-06

## 2020-01-06 NOTE — TELEPHONE ENCOUNTER
RN s/w pt regarding previous  Pt appreciative and will give the injection one more week to start to work and will call back with progress in a week

## 2020-01-06 NOTE — TELEPHONE ENCOUNTER
Aware, would recommend she give it another week to see if the steroid starts to take effect  If there's no relief the next step would be to come back in for follow up with me and evaluate further options

## 2020-01-20 NOTE — TELEPHONE ENCOUNTER
Called patient to schedule ovs with NP, patient stated she'd like to wait since her son recommended a book to her about the [facility] method and wants to try that

## 2020-01-22 ENCOUNTER — OFFICE VISIT (OUTPATIENT)
Dept: URGENT CARE | Facility: MEDICAL CENTER | Age: 70
End: 2020-01-22
Payer: MEDICARE

## 2020-01-22 VITALS
HEART RATE: 92 BPM | TEMPERATURE: 98.3 F | OXYGEN SATURATION: 98 % | BODY MASS INDEX: 29.02 KG/M2 | HEIGHT: 64 IN | DIASTOLIC BLOOD PRESSURE: 92 MMHG | WEIGHT: 170 LBS | SYSTOLIC BLOOD PRESSURE: 172 MMHG

## 2020-01-22 DIAGNOSIS — S61.411A LACERATION OF RIGHT HAND WITHOUT FOREIGN BODY, INITIAL ENCOUNTER: Primary | ICD-10-CM

## 2020-01-22 PROCEDURE — G0463 HOSPITAL OUTPT CLINIC VISIT: HCPCS | Performed by: PHYSICIAN ASSISTANT

## 2020-01-22 PROCEDURE — 12001 RPR S/N/AX/GEN/TRNK 2.5CM/<: CPT | Performed by: PHYSICIAN ASSISTANT

## 2020-01-22 PROCEDURE — 99213 OFFICE O/P EST LOW 20 MIN: CPT | Performed by: PHYSICIAN ASSISTANT

## 2020-01-23 NOTE — PROGRESS NOTES
[facility]        NAME: Lauren Fuentes is a 71 y o  female  : 1950    MRN: [id number]  DATE: 2020  TIME: 8:22 PM    Assessment and Plan   Laceration of right hand without foreign body, initial encounter [S61 411A]  1  Laceration of right hand without foreign body, initial encounter  Laceration repair     Assessment/plan:  1  Laceration of right hand without foreign body, initial encounter  - Advised pt to watch out for the wound re-opening  Keep clean and dry  Keep covered with a bandaid  Counseled pt that she may shower with the glue, but do not use soaps or moisturizers over that area because they can dissolve the glue  Eventually, the glue will dissolve on its own  Pat it dry after showering  Watch for signs of infection such as redness, warmth, drainage from the wound, red streaking up your hand/arm  If the wound opens up or any signs of infection, come back to urgent care or go to the ER  Patient Instructions       Follow up with PCP in 3-5 days  Proceed to  ER if symptoms worsen  Chief Complaint     Chief Complaint   Patient presents with   [facility] states she was cutting a cucumber and sliced her hand and  it won't stop bleeding         History of Present Illness       Pt is a 71 y o female presenting for a right hand laceration   Pt was cutting a cucumber when she accidentally slipped and cut her hand  She states she takes a baby aspirin every day and has had trouble getting it to stop bleeding  Happened around 5 o'clock  Pt denies numbness/tingling  She tried gauze, bacitracin, ice, and putting pressure  Nothing will slow the bleeding       Review of Systems   Review of Systems   Respiratory: Negative for apnea, cough, choking, chest tightness, shortness of breath, wheezing and stridor  Cardiovascular: Negative for chest pain, palpitations and leg swelling  Skin: Positive for wound  Negative for color change, pallor and rash  Neurological: Positive for light-headedness (patient does not do well with blood )  Negative for dizziness, syncope and headaches  Current Medications       Current Outpatient Medications:     aspirin 81 MG tablet, Take 1 tablet by mouth daily, Disp: , Rfl:     hydrochlorothiazide (MICROZIDE) 12 5 mg capsule, Take 1 capsule (12 5 mg total) by mouth daily, Disp: 90 capsule, Rfl: 3    levothyroxine 50 mcg tablet, TAKE 1 TABLET (50 MCG TOTAL) BY MOUTH DAILY IN THE EARLY MORNING, Disp: 90 tablet, Rfl: 1    levothyroxine 75 mcg tablet, Take 1 tablet (75 mcg total) by mouth daily in the early morning, Disp: 30 tablet, Rfl: 5    omega-3-acid ethyl esters (LOVAZA) 1 g capsule, Take 2 capsules (2 g total) by mouth 2 (two) times a day Generic if possible, patient request, Disp: 360 capsule, Rfl: 3    Current Allergies     Allergies as of 2020 - Reviewed 2020   Allergen Reaction Noted    Penicillins  2009            The following portions of the patient's history were reviewed and updated as appropriate: allergies, current medications, past family history, past medical history, past social history, past surgical history and problem list      Past Medical History:   Diagnosis Date    Hyperlipidemia     Hypertension        Past Surgical History:   Procedure Laterality Date     SECTION  , ,        Family History   Problem Relation Age of Onset    Other Mother         aortic valve disorder    Stroke Mother         CVA    Hyperlipidemia Mother        Medications have been verified  Objective   BP (!) 172/92   Pulse 92   Temp 98 3 °F (36 8 °C)   Ht 5' 4" (1 626 m)   Wt 77 1 kg (170 lb)   LMP  (LMP Unknown)   SpO2 98%   BMI 29 18 kg/m²          Physical Exam     Physical Exam   Constitutional: She is oriented to person, place, and time  She appears well-developed and well-nourished  No distress     Neurological: She is alert and oriented to person, place, and time    Skin: Skin is warm and dry  Laceration (2 cm, "U" shaped, superficial laceration on lateral aspect of right hand) noted  No abrasion, no bruising, no burn, no ecchymosis, no lesion and no petechiae noted  She is not diaphoretic  No erythema  No pallor  Psychiatric: She has a normal mood and affect  Her behavior is normal    Nursing note and vitals reviewed  Laceration repair  Date/Time: 1/22/2020 8:17 PM  Performed by: Gayle Horton PA-C  Authorized by: Gayle Horton PA-C   Consent: Verbal consent obtained  Consent given by: patient  Body area: upper extremity  Location details: right hand  Foreign bodies: no foreign bodies  Tendon involvement: none  Nerve involvement: none  Vascular damage: no      Procedure Details:  Irrigation solution: saline  Amount of cleaning: standard  Debridement: none  Degree of undermining: none  Wound skin closure material used: Dermabond    Dressing: 4x4 sterile gauze and non-adhesive packing strip  Patient tolerance: Patient tolerated the procedure well with no immediate complications (patient felt light-headed and stayed in the room and laid down for a couple minutes after the procedure was done)

## 2020-01-23 NOTE — PATIENT INSTRUCTIONS
Watch out for the wound re-opening  Keep clean and dry  Keep covered with a bandaid  You may shower with the glue, but do not use soaps or moisturizers over that area because they can dissolve the glue  Eventually, the glue will dissolve on its own  Pat it dry after showering  Watch for signs of infection such as redness, warmth, drainage from the wound, red streaking up your hand/arm  If the wound opens up or any signs of infection, come back to urgent care or go to the ER  Laceration   WHAT YOU NEED TO KNOW:   A laceration is an injury to the skin and the soft tissue underneath it  Lacerations happen when you are cut or hit by something  They can happen anywhere on the body  DISCHARGE INSTRUCTIONS:   Return to the emergency department if:   · You have heavy bleeding or bleeding that does not stop after 10 minutes of holding firm, direct pressure over the wound  · Your wound opens up  Contact your healthcare provider if:   · You have a fever or chills  · Your laceration is red, warm, or swollen  · You have red streaks on your skin coming from your wound  · You have white or yellow drainage from the wound that smells bad  · You have pain that gets worse, even after treatment  · You have questions or concerns about your condition or care  Medicines:   · Prescription pain medicine  may be given  Ask how to take this medicine safely  · Antibiotics  help treat or prevent a bacterial infection  · Take your medicine as directed  Contact your healthcare provider if you think your medicine is not helping or if you have side effects  Tell him or her if you are allergic to any medicine  Keep a list of the medicines, vitamins, and herbs you take  Include the amounts, and when and why you take them  Bring the list or the pill bottles to follow-up visits  Carry your medicine list with you in case of an emergency    Care for your wound as directed:   · Do not get your wound wet  until your healthcare provider says it is okay  Do not soak your wound in water  Do not go swimming until your healthcare provider says it is okay  Carefully wash the wound with soap and water  Gently pat the area dry or allow it to air dry  · Change your bandages  when they get wet, dirty, or after washing  Apply new, clean bandages as directed  Do not apply elastic bandages or tape too tight  Do not put powders or lotions over your incision  · Apply antibiotic ointment as directed  Your healthcare provider may give you antibiotic ointment to put over your wound if you have stitches  If you have strips of tape over your incision, let them dry up and fall off on their own  If they do not fall off within 14 days, gently remove them  If you have glue over your wound, do not remove or pick at it  If your glue comes off, do not replace it with glue that you have at home  · Check your wound every day for signs of infection such as swelling, redness, or pus  Self-care:   · Apply ice  on your wound for 15 to 20 minutes every hour or as directed  Use an ice pack, or put crushed ice in a plastic bag  Cover it with a towel  Ice helps prevent tissue damage and decreases swelling and pain  · Use a splint as directed  A splint will decrease movement and stress on your wound  It may help it heal faster  A splint may be used for lacerations over joints or areas of your body that bend  Ask your healthcare provider how to apply and remove a splint  · Decrease scarring of your wound  by applying ointments as directed  Do not apply ointments until your healthcare provider says it is okay  You may need to wait until your wound is healed  Ask which ointment to buy and how often to use it  After your wound is healed, use sunscreen over the area when you are out in the sun  You should do this for at least 6 months to 1 year after your injury  Follow up with your healthcare provider as directed:   You may need to follow up in 24 to 48 hours to have your wound checked for infection  You will need to return in 3 to 14 days if you have stitches or staples so they can be removed  Care for your wound as directed to prevent infection and help it heal  Write down your questions so you remember to ask them during your visits  © 2017 [employer] Information is for End User's use only and may not be sold, redistributed or otherwise used for commercial purposes  All illustrations and images included in CareNotes® are the copyrighted property of [employer] , [employer]  or [employer]  The above information is an  only  It is not intended as medical advice for individual conditions or treatments  Talk to your doctor, nurse or pharmacist before following any medical regimen to see if it is safe and effective for you

## 2020-03-03 DIAGNOSIS — E03.8 OTHER SPECIFIED HYPOTHYROIDISM: ICD-10-CM

## 2020-03-03 RX ORDER — LEVOTHYROXINE SODIUM 0.07 MG/1
TABLET ORAL
Qty: 90 TABLET | Refills: 3 | Status: SHIPPED | OUTPATIENT
Start: 2020-03-03 | End: 2020-06-08 | Stop reason: SDUPTHER

## 2020-04-01 DIAGNOSIS — E78.2 MIXED HYPERLIPIDEMIA: ICD-10-CM

## 2020-04-01 RX ORDER — OMEGA-3-ACID ETHYL ESTERS 1 G/1
CAPSULE, LIQUID FILLED ORAL
Qty: 360 CAPSULE | Refills: 3 | Status: SHIPPED | OUTPATIENT
Start: 2020-04-01 | End: 2020-06-08 | Stop reason: SDUPTHER

## 2020-04-02 ENCOUNTER — TELEPHONE (OUTPATIENT)
Dept: FAMILY MEDICINE CLINIC | Facility: CLINIC | Age: 70
End: 2020-04-02

## 2020-04-20 DIAGNOSIS — I10 BENIGN ESSENTIAL HYPERTENSION: ICD-10-CM

## 2020-04-20 RX ORDER — HYDROCHLOROTHIAZIDE 12.5 MG/1
CAPSULE, GELATIN COATED ORAL
Qty: 90 CAPSULE | Refills: 3 | Status: SHIPPED | OUTPATIENT
Start: 2020-04-20 | End: 2020-06-08 | Stop reason: SDUPTHER

## 2020-05-12 ENCOUNTER — TELEPHONE (OUTPATIENT)
Dept: FAMILY MEDICINE CLINIC | Facility: CLINIC | Age: 70
End: 2020-05-12

## 2020-06-08 ENCOUNTER — TELEMEDICINE (OUTPATIENT)
Dept: FAMILY MEDICINE CLINIC | Facility: CLINIC | Age: 70
End: 2020-06-08
Payer: MEDICARE

## 2020-06-08 VITALS — HEIGHT: 64 IN | BODY MASS INDEX: 29.02 KG/M2 | TEMPERATURE: 97.8 F | WEIGHT: 170 LBS

## 2020-06-08 DIAGNOSIS — E78.2 MIXED HYPERLIPIDEMIA: ICD-10-CM

## 2020-06-08 DIAGNOSIS — I10 BENIGN ESSENTIAL HYPERTENSION: ICD-10-CM

## 2020-06-08 DIAGNOSIS — E03.8 OTHER SPECIFIED HYPOTHYROIDISM: ICD-10-CM

## 2020-06-08 PROBLEM — M17.10 OSTEOARTHRITIS OF KNEE: Status: ACTIVE | Noted: 2019-11-19

## 2020-06-08 PROCEDURE — 99214 OFFICE O/P EST MOD 30 MIN: CPT | Performed by: INTERNAL MEDICINE

## 2020-06-08 RX ORDER — LEVOTHYROXINE SODIUM 0.07 MG/1
75 TABLET ORAL
Qty: 90 TABLET | Refills: 3 | Status: SHIPPED | OUTPATIENT
Start: 2020-06-08

## 2020-06-08 RX ORDER — OMEGA-3-ACID ETHYL ESTERS 1 G/1
2 CAPSULE, LIQUID FILLED ORAL 2 TIMES DAILY
Qty: 360 CAPSULE | Refills: 3 | Status: SHIPPED | OUTPATIENT
Start: 2020-06-08

## 2020-06-08 RX ORDER — HYDROCHLOROTHIAZIDE 12.5 MG/1
12.5 CAPSULE, GELATIN COATED ORAL DAILY
Qty: 90 CAPSULE | Refills: 3 | Status: SHIPPED | OUTPATIENT
Start: 2020-06-08

## 2020-08-04 ENCOUNTER — TELEPHONE (OUTPATIENT)
Dept: FAMILY MEDICINE CLINIC | Facility: CLINIC | Age: 70
End: 2020-08-04

## 2020-08-04 NOTE — TELEPHONE ENCOUNTER
DR GAINES [facility]    Patient is moving to [place] in October and is taking the auto train  She is asking if you will prescribe hydroxychlorquine  Please advise

## 2020-08-06 NOTE — TELEPHONE ENCOUNTER
We do not prescribe hydroxychloroquine because it has not been proven to help and can actually have heart side effects

## 2020-08-06 NOTE — TELEPHONE ENCOUNTER
Patient was informed and she stated that she will go with what Dr Bronson Bella says    Ita James MA

## 2021-01-26 ENCOUNTER — TELEPHONE (OUTPATIENT)
Dept: FAMILY MEDICINE CLINIC | Facility: CLINIC | Age: 71
End: 2021-01-26

## 2021-01-26 NOTE — TELEPHONE ENCOUNTER
Called patient to schedule an AWV as she is due  Patient said she has moved to [place] and will no longer continue care at our practice  Please remove Dr Anthony Leader as patients PCP  Thank you   Justyna Garcia MA

## 2021-02-01 NOTE — TELEPHONE ENCOUNTER
01/31/21 9:47 PM     Thank you for your request  Your request has been received, reviewed, and the patient chart updated  The PCP has successfully been removed with a patient attribution note  This message will now be completed      Thank you  Thalia Mcneill

## 2021-06-14 DIAGNOSIS — E78.2 MIXED HYPERLIPIDEMIA: ICD-10-CM

## 2021-06-14 RX ORDER — OMEGA-3-ACID ETHYL ESTERS 1 G/1
2 CAPSULE, LIQUID FILLED ORAL 2 TIMES DAILY
Qty: 360 CAPSULE | Refills: 2 | OUTPATIENT
Start: 2021-06-14